# Patient Record
Sex: FEMALE | Race: WHITE | NOT HISPANIC OR LATINO | Employment: OTHER | URBAN - METROPOLITAN AREA
[De-identification: names, ages, dates, MRNs, and addresses within clinical notes are randomized per-mention and may not be internally consistent; named-entity substitution may affect disease eponyms.]

---

## 2023-03-20 ENCOUNTER — TELEPHONE (OUTPATIENT)
Dept: PRIMARY CARE | Facility: CLINIC | Age: 68
End: 2023-03-20
Payer: MEDICARE

## 2023-03-27 ENCOUNTER — CLINICAL SUPPORT (OUTPATIENT)
Dept: PRIMARY CARE | Facility: CLINIC | Age: 68
End: 2023-03-27
Payer: MEDICARE

## 2023-03-27 DIAGNOSIS — J30.2 SEASONAL ALLERGIC RHINITIS, UNSPECIFIED TRIGGER: ICD-10-CM

## 2023-03-27 PROCEDURE — 96372 THER/PROPH/DIAG INJ SC/IM: CPT | Performed by: FAMILY MEDICINE

## 2023-03-27 RX ORDER — TRIAMCINOLONE ACETONIDE 40 MG/ML
80 INJECTION, SUSPENSION INTRA-ARTICULAR; INTRAMUSCULAR ONCE
Status: COMPLETED | OUTPATIENT
Start: 2023-03-27 | End: 2023-03-27

## 2023-03-27 RX ADMIN — TRIAMCINOLONE ACETONIDE 80 MG: 40 INJECTION, SUSPENSION INTRA-ARTICULAR; INTRAMUSCULAR at 18:03

## 2023-03-27 NOTE — PROGRESS NOTES
Pt presents for Kenalog injection per Dr Morales. 2 mL given IM in R gluteal region; no issues w/ injection. Pt tolerated well.

## 2023-04-12 ENCOUNTER — TELEMEDICINE (OUTPATIENT)
Dept: PRIMARY CARE | Facility: CLINIC | Age: 68
End: 2023-04-12
Payer: MEDICARE

## 2023-04-12 DIAGNOSIS — U07.1 COVID-19: Primary | ICD-10-CM

## 2023-04-12 PROBLEM — L65.8 ALOPECIA DUE TO CYTOTOXIC DRUG: Status: ACTIVE | Noted: 2017-08-02

## 2023-04-12 PROBLEM — T45.1X5A CHEMOTHERAPY-INDUCED PERIPHERAL NEUROPATHY (MULTI): Status: ACTIVE | Noted: 2017-08-02

## 2023-04-12 PROBLEM — E55.9 VITAMIN D DEFICIENCY: Status: ACTIVE | Noted: 2017-08-02

## 2023-04-12 PROBLEM — M26.629 ARTHRALGIA OF TEMPOROMANDIBULAR JOINT, UNSPECIFIED SIDE: Status: ACTIVE | Noted: 2023-04-12

## 2023-04-12 PROBLEM — R09.89: Status: ACTIVE | Noted: 2023-04-12

## 2023-04-12 PROBLEM — M89.9 DISORDER OF BONE, UNSPECIFIED: Status: ACTIVE | Noted: 2023-04-12

## 2023-04-12 PROBLEM — J30.9 ALLERGIC RHINITIS: Status: ACTIVE | Noted: 2023-04-12

## 2023-04-12 PROBLEM — E78.5 HYPERLIPIDEMIA: Status: ACTIVE | Noted: 2023-04-12

## 2023-04-12 PROBLEM — H26.33 CATARACT OF BOTH EYES DUE TO DRUG: Status: ACTIVE | Noted: 2017-08-02

## 2023-04-12 PROBLEM — E03.9 ACQUIRED HYPOTHYROIDISM: Status: ACTIVE | Noted: 2017-08-02

## 2023-04-12 PROBLEM — R91.8 MULTIPLE LUNG NODULES ON CT: Status: ACTIVE | Noted: 2017-08-02

## 2023-04-12 PROBLEM — T45.1X5A ALOPECIA DUE TO CYTOTOXIC DRUG: Status: ACTIVE | Noted: 2017-08-02

## 2023-04-12 PROBLEM — G62.0 CHEMOTHERAPY-INDUCED PERIPHERAL NEUROPATHY (MULTI): Status: ACTIVE | Noted: 2017-08-02

## 2023-04-12 PROBLEM — M85.80 OSTEOPENIA: Status: ACTIVE | Noted: 2023-04-12

## 2023-04-12 PROBLEM — C50.919: Status: ACTIVE | Noted: 2023-04-12

## 2023-04-12 PROCEDURE — 99212 OFFICE O/P EST SF 10 MIN: CPT | Performed by: NURSE PRACTITIONER

## 2023-04-12 RX ORDER — ALBUTEROL SULFATE 90 UG/1
AEROSOL, METERED RESPIRATORY (INHALATION)
COMMUNITY
Start: 2021-11-21

## 2023-04-12 RX ORDER — NIRMATRELVIR AND RITONAVIR 300-100 MG
3 KIT ORAL 2 TIMES DAILY
Qty: 30 TABLET | Refills: 0 | Status: SHIPPED | OUTPATIENT
Start: 2023-04-12 | End: 2023-04-17

## 2023-04-12 RX ORDER — LEVOTHYROXINE SODIUM 50 UG/1
TABLET ORAL
COMMUNITY
End: 2023-09-07 | Stop reason: SDUPTHER

## 2023-04-12 RX ORDER — VIT C/E/ZN/COPPR/LUTEIN/ZEAXAN 250MG-90MG
25 CAPSULE ORAL 2 TIMES DAILY
COMMUNITY
Start: 2020-04-10

## 2023-04-12 RX ORDER — TOBRAMYCIN 3 MG/ML
SOLUTION/ DROPS OPHTHALMIC
COMMUNITY
Start: 2020-01-02 | End: 2024-05-21 | Stop reason: SDUPTHER

## 2023-04-12 RX ORDER — PRAVASTATIN SODIUM 20 MG/1
20 TABLET ORAL DAILY
COMMUNITY
End: 2023-09-29 | Stop reason: SDUPTHER

## 2023-04-12 RX ORDER — IBANDRONATE SODIUM 150 MG/1
150 TABLET, FILM COATED ORAL
COMMUNITY

## 2023-04-12 ASSESSMENT — ENCOUNTER SYMPTOMS
SINUS PAIN: 0
CHILLS: 1
COUGH: 1
SORE THROAT: 1
SHORTNESS OF BREATH: 0
WHEEZING: 0
FATIGUE: 1
DIARRHEA: 0
NAUSEA: 0
FEVER: 0
ABDOMINAL PAIN: 0
SINUS PRESSURE: 0
VOMITING: 0

## 2023-04-12 NOTE — PROGRESS NOTES
Subjective   Chief Complaint: Covid-19 Home Monitoring Video Visit (Positive 4/12. Symptoms began 4/11).    HPI   Tatum Molina is a 67 y.o. female who presents for Covid-19 Home Monitoring Video Visit (Positive 4/12. Symptoms began 4/11).    Symptom yesterday -- runny nose, post nasal drip, cough.   Patient denies fever, chills, nausea, vomiting, diarrhea, chest pain, heart palpations, or shortness of breath.   Has not been using anything OTC.     Review of Systems   Constitutional:  Positive for chills and fatigue. Negative for fever.   HENT:  Positive for postnasal drip and sore throat. Negative for dental problem, ear discharge, ear pain, sinus pressure and sinus pain.    Respiratory:  Positive for cough. Negative for shortness of breath and wheezing.    Cardiovascular:  Negative for chest pain.   Gastrointestinal:  Negative for abdominal pain, diarrhea, nausea and vomiting.       Objective   There were no vitals taken for this visit.  BSA There is no height or weight on file to calculate BSA.      Physical Exam  Legacy Encounter on 01/09/2023   Component Date Value Ref Range Status    TSH 01/09/2023 3.31  0.44 - 3.98 mIU/L Final    Comment:  TSH testing is performed using different testing    methodology at [facility] than at other    [facility]. Direct result comparisons should    only be made within the same method.     Legacy Encounter on 12/01/2022   Component Date Value Ref Range Status    Glucose 12/01/2022 92  74 - 99 mg/dL Final    Sodium 12/01/2022 141  136 - 145 mmol/L Final    Potassium 12/01/2022 4.0  3.5 - 5.3 mmol/L Final    Chloride 12/01/2022 107  98 - 107 mmol/L Final    Bicarbonate 12/01/2022 28  21 - 32 mmol/L Final    Anion Gap 12/01/2022 10  10 - 20 mmol/L Final    Urea Nitrogen 12/01/2022 12  6 - 23 mg/dL Final    Creatinine 12/01/2022 0.78  0.50 - 1.05 mg/dL Final    GFR Female 12/01/2022 82  >90 mL/min/1.73m2 Final    Comment:  CALCULATIONS OF ESTIMATED GFR ARE  PERFORMED   USING THE 2021 CKD-EPI STUDY REFIT EQUATION   WITHOUT THE RACE VARIABLE FOR THE IDMS-TRACEABLE   CREATININE METHODS.    https://jasn.asnjournals.org/content/early/2021/09/22/ASN.3068586219      Calcium 12/01/2022 9.2  8.6 - 10.6 mg/dL Final    Albumin 12/01/2022 3.9  3.4 - 5.0 g/dL Final    Alkaline Phosphatase 12/01/2022 28 (L)  33 - 136 U/L Final    Total Protein 12/01/2022 6.2 (L)  6.4 - 8.2 g/dL Final    AST 12/01/2022 27  9 - 39 U/L Final    Total Bilirubin 12/01/2022 0.6  0.0 - 1.2 mg/dL Final    ALT (SGPT) 12/01/2022 24  7 - 45 U/L Final    Comment:  Patients treated with Sulfasalazine may generate    falsely decreased results for ALT.      Cholesterol 12/01/2022 172  0 - 199 mg/dL Final    Comment: .      AGE      DESIRABLE   BORDERLINE HIGH   HIGH     0-19 Y     0 - 169       170 - 199     >/= 200    20-24 Y     0 - 189       190 - 224     >/= 225         >24 Y     0 - 199       200 - 239     >/= 240   **All ranges are based on fasting samples. Specific   therapeutic targets will vary based on patient-specific   cardiac risk.  .   Pediatric guidelines reference:Pediatrics 2011, 128(S5).   Adult guidelines reference: NCEP ATPIII Guidelines,     GAYE 2001, 258:2486-97  .   Venipuncture immediately after or during the    administration of Metamizole may lead to falsely   low results. Testing should be performed immediately   prior to Metamizole dosing.      HDL 12/01/2022 63.9  mg/dL Final    Comment: .      AGE      VERY LOW   LOW     NORMAL    HIGH       0-19 Y       < 35   < 40     40-45     ----    20-24 Y       ----   < 40       >45     ----      >24 Y       ----   < 40     40-60      >60  .      Cholesterol/HDL Ratio 12/01/2022 2.7   Final    Comment: REF VALUES  DESIRABLE  < 3.4  HIGH RISK  > 5.0      LDL 12/01/2022 88  0 - 99 mg/dL Final    Comment: .                           NEAR      BORD      AGE      DESIRABLE  OPTIMAL    HIGH     HIGH     VERY HIGH     0-19 Y     0 - 109     ---     110-129   >/= 130     ----    20-24 Y     0 - 119     ---    120-159   >/= 160     ----      >24 Y     0 -  99   100-129  130-159   160-189     >/=190  .      VLDL 12/01/2022 20  0 - 40 mg/dL Final    Triglycerides 12/01/2022 100  0 - 149 mg/dL Final    Comment: .      AGE      DESIRABLE   BORDERLINE HIGH   HIGH     VERY HIGH   0 D-90 D    19 - 174         ----         ----        ----  91 D- 9 Y     0 -  74        75 -  99     >/= 100      ----    10-19 Y     0 -  89        90 - 129     >/= 130      ----    20-24 Y     0 - 114       115 - 149     >/= 150      ----         >24 Y     0 - 149       150 - 199    200- 499    >/= 500  .   Venipuncture immediately after or during the    administration of Metamizole may lead to falsely   low results. Testing should be performed immediately   prior to Metamizole dosing.      TSH 12/01/2022 4.12 (H)  0.44 - 3.98 mIU/L Final    Comment:  TSH testing is performed using different testing    methodology at [facility] than at other    [facility]. Direct result comparisons should    only be made within the same method.      WBC 12/01/2022 7.3  4.4 - 11.3 x10E9/L Final    nRBC 12/01/2022 0.0  0.0 - 0.0 /100 WBC Final    RBC 12/01/2022 3.40 (L)  4.00 - 5.20 x10E12/L Final    Hemoglobin 12/01/2022 13.1  12.0 - 16.0 g/dL Final    Hematocrit 12/01/2022 38.2  36.0 - 46.0 % Final    MCV 12/01/2022 112 (H)  80 - 100 fL Final    MCHC 12/01/2022 34.3  32.0 - 36.0 g/dL Final    Platelets 12/01/2022 131 (L)  150 - 450 x10E9/L Final    RDW 12/01/2022 14.1  11.5 - 14.5 % Final    Neutrophils % 12/01/2022 20.8  40.0 - 80.0 % Final    Immature Granulocytes %, Automated 12/01/2022 0.1  0.0 - 0.9 % Final    Comment:  Immature Granulocyte Count (IG) includes promyelocytes,    myelocytes and metamyelocytes but does not include bands.   Percent differential counts (%) should be interpreted in the   context of the absolute cell counts (cells/L).      Lymphocytes % 12/01/2022 71.5   13.0 - 44.0 % Final    Monocytes % 12/01/2022 5.8  2.0 - 10.0 % Final    Eosinophils % 12/01/2022 1.4  0.0 - 6.0 % Final    Basophils % 12/01/2022 0.4  0.0 - 2.0 % Final    Neutrophils Absolute 12/01/2022 1.51  1.20 - 7.70 x10E9/L Final    Lymphocytes Absolute 12/01/2022 5.20 (H)  1.20 - 4.80 x10E9/L Final    Monocytes Absolute 12/01/2022 0.42  0.10 - 1.00 x10E9/L Final    Eosinophils Absolute 12/01/2022 0.10  0.00 - 0.70 x10E9/L Final    Basophils Absolute 12/01/2022 0.03  0.00 - 0.10 x10E9/L Final    Vitamin D, 25-Hydroxy 12/01/2022 55  ng/mL Final    Comment: .  DEFICIENCY:         < 20   NG/ML  INSUFFICIENCY:      20-29  NG/ML  SUFFICIENCY:         NG/ML    THIS ASSAY ACCURATELY QUANTIFIES THE SUM OF  VITAMIN D3, 25-HYDROXY AND VIT D2,25-HYDROXY.      Free T4 12/01/2022 1.10  0.78 - 1.48 ng/dL Final    Comment:  Thyroxine Free testing is performed using different testing    methodology at [facility] than at other    [facility]. Direct result comparisons should    only be made within the same method.     Legacy Encounter on 07/14/2022   Component Date Value Ref Range Status    WBC 07/14/2022 5.9  4.4 - 11.3 x10E9/L Final    nRBC 07/14/2022 0.0  0.0 - 0.0 /100 WBC Final    RBC 07/14/2022 3.45 (L)  4.00 - 5.20 x10E12/L Final    Hemoglobin 07/14/2022 13.1  12.0 - 16.0 g/dL Final    Hematocrit 07/14/2022 37.8  36.0 - 46.0 % Final    MCV 07/14/2022 110 (H)  80 - 100 fL Final    MCHC 07/14/2022 34.7  32.0 - 36.0 g/dL Final    Platelets 07/14/2022 131 (L)  150 - 450 x10E9/L Final    RDW 07/14/2022 13.0  11.5 - 14.5 % Final    Neutrophils % 07/14/2022 24.0  40.0 - 80.0 % Final    Immature Granulocytes %, Automated 07/14/2022 0.0  0.0 - 0.9 % Final    Comment:  Immature Granulocyte Count (IG) includes promyelocytes,    myelocytes and metamyelocytes but does not include bands.   Percent differential counts (%) should be interpreted in the   context of the absolute cell counts (cells/L).       Lymphocytes % 07/14/2022 63.7  13.0 - 44.0 % Final    Monocytes % 07/14/2022 8.3  2.0 - 10.0 % Final    Eosinophils % 07/14/2022 3.0  0.0 - 6.0 % Final    Basophils % 07/14/2022 1.0  0.0 - 2.0 % Final    Neutrophils Absolute 07/14/2022 1.42  1.20 - 7.70 x10E9/L Final    Lymphocytes Absolute 07/14/2022 3.78  1.20 - 4.80 x10E9/L Final    Monocytes Absolute 07/14/2022 0.49  0.10 - 1.00 x10E9/L Final    Eosinophils Absolute 07/14/2022 0.18  0.00 - 0.70 x10E9/L Final    Basophils Absolute 07/14/2022 0.06  0.00 - 0.10 x10E9/L Final     Current Outpatient Medications on File Prior to Visit   Medication Sig Dispense Refill    albuterol 90 mcg/actuation inhaler Inhale.      cetirizine (ZYRTEC) 10 mg capsule Take 1 capsule (10 mg) by mouth once daily.      cholecalciferol (Vitamin D-3) 25 MCG (1000 UT) capsule Take 1 capsule (25 mcg) by mouth once daily.      ibandronate (Boniva) 150 mg tablet Take 1 tablet (150 mg) by mouth every 30 (thirty) days.      levothyroxine (Synthroid, Levoxyl) 50 mcg tablet TAKE 1 TABLET BY MOUTH DAILY except TAKE 2 TABLETS BY MOUTH SUNDAY      pravastatin (Pravachol) 20 mg tablet Take 1 tablet (20 mg) by mouth once daily.      tobramycin (Tobrex) 0.3 % ophthalmic solution prn       No current facility-administered medications on file prior to visit.     No images are attached to the encounter.            Assessment/Plan   Problem List Items Addressed This Visit          Infectious/Inflammatory    COVID-19 - Primary     - Advised patient to quarantine for 5 days from symptom onset  - Advised patient to wear a mask for 10 days   - Paxlovid sent to pharmacy  - Patient to report to ED if develops chest pain, SOB, or fevers unrelieved by tyelnol/advil           Relevant Medications    nirmatrelvir-ritonavir (Paxlovid, EUA,) 300 mg (150 mg x 2)-100 mg tablet therapy pack

## 2023-06-29 ENCOUNTER — TELEPHONE (OUTPATIENT)
Dept: PRIMARY CARE | Facility: CLINIC | Age: 68
End: 2023-06-29
Payer: MEDICARE

## 2023-06-29 DIAGNOSIS — E55.9 VITAMIN D DEFICIENCY: ICD-10-CM

## 2023-06-29 DIAGNOSIS — R09.89: ICD-10-CM

## 2023-06-29 DIAGNOSIS — C50.919 ADENOCARCINOMA OF BREAST, UNSPECIFIED LATERALITY (MULTI): ICD-10-CM

## 2023-06-29 DIAGNOSIS — E03.9 ACQUIRED HYPOTHYROIDISM: ICD-10-CM

## 2023-06-29 DIAGNOSIS — E78.2 MIXED HYPERLIPIDEMIA: ICD-10-CM

## 2023-06-29 NOTE — TELEPHONE ENCOUNTER
Please schedule patient for [facility] wellness visit after 12/7/2023.    Thank you-  Edd Driscoll CMA  6/29/2023  Practice Supervisor  [facility]

## 2023-07-08 ENCOUNTER — OFFICE VISIT (OUTPATIENT)
Dept: PRIMARY CARE | Facility: CLINIC | Age: 68
End: 2023-07-08
Payer: MEDICARE

## 2023-07-08 VITALS
HEART RATE: 80 BPM | DIASTOLIC BLOOD PRESSURE: 68 MMHG | BODY MASS INDEX: 28 KG/M2 | SYSTOLIC BLOOD PRESSURE: 140 MMHG | WEIGHT: 147 LBS

## 2023-07-08 DIAGNOSIS — M25.512 ACUTE PAIN OF LEFT SHOULDER: Primary | ICD-10-CM

## 2023-07-08 PROCEDURE — 1036F TOBACCO NON-USER: CPT | Performed by: FAMILY MEDICINE

## 2023-07-08 PROCEDURE — 1159F MED LIST DOCD IN RCRD: CPT | Performed by: FAMILY MEDICINE

## 2023-07-08 PROCEDURE — 99214 OFFICE O/P EST MOD 30 MIN: CPT | Performed by: FAMILY MEDICINE

## 2023-07-08 ASSESSMENT — ENCOUNTER SYMPTOMS
APPETITE CHANGE: 0
PALPITATIONS: 0
ACTIVITY CHANGE: 0
COUGH: 0
DIZZINESS: 0
FATIGUE: 0
COLOR CHANGE: 0
MYALGIAS: 1
HEADACHES: 0
LIGHT-HEADEDNESS: 0
BACK PAIN: 0
CHEST TIGHTNESS: 0
FEVER: 0
ARTHRALGIAS: 0
FACIAL ASYMMETRY: 0
CHOKING: 0

## 2023-07-08 NOTE — PROGRESS NOTES
Subjective   Patient ID: Tatum Molina is a 67 y.o. female who presents for Left shoulder pain X Sunday. .    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 Last Sunday she felt some pain in her neck and shoulder. She thinks this is maybe her rotator cuff. She aggrevated this by carrying her 30+ lb granddaughter. She felt that this made her shoulder worse. She was there carrying her for about 2+ hours. She woke up next am and could feel this getting worse. She did put a Iidocaine patch on this the next day.     Review of Systems   Constitutional:  Negative for activity change, appetite change, fatigue and fever.   HENT:  Negative for congestion.    Respiratory:  Negative for cough, choking and chest tightness.    Cardiovascular:  Negative for chest pain, palpitations and leg swelling.   Musculoskeletal:  Positive for myalgias. Negative for arthralgias, back pain and gait problem.        Left shoulder pain   Skin:  Negative for color change and pallor.   Neurological:  Negative for dizziness, facial asymmetry, light-headedness and headaches.       Objective   /68 (BP Location: Right arm)   Pulse 80   Wt 66.7 kg (147 lb)   BMI 28.00 kg/m²   BSA Body surface area is 1.69 meters squared.      Physical Exam  Constitutional:       General: She is not in acute distress.     Appearance: Normal appearance. She is not toxic-appearing.   HENT:      Head: Normocephalic.   Eyes:      Conjunctiva/sclera: Conjunctivae normal.      Pupils: Pupils are equal, round, and reactive to light.   Cardiovascular:      Rate and Rhythm: Normal rate and regular rhythm.   Musculoskeletal:         General: No swelling.      Right shoulder: Normal.      Left shoulder: Swelling and tenderness present.      Cervical back: No tenderness.      Comments: Pain to palpation over bicipital groove, negative strain to empty can test, cross body motion, negative pain  to 0-90 degrees and  degrees, negative internal rotation   Skin:     Findings: No lesion or rash.   Neurological:      General: No focal deficit present.      Mental Status: She is alert and oriented to person, place, and time. Mental status is at baseline.      Gait: Gait normal.   Psychiatric:         Mood and Affect: Mood normal.         Behavior: Behavior normal.         Thought Content: Thought content normal.         Judgment: Judgment normal.       Legacy Encounter on 01/09/2023   Component Date Value Ref Range Status    TSH 01/09/2023 3.31  0.44 - 3.98 mIU/L Final    Comment:  TSH testing is performed using different testing    methodology at [facility] than at other    [facility]. Direct result comparisons should    only be made within the same method.     Legacy Encounter on 12/01/2022   Component Date Value Ref Range Status    Glucose 12/01/2022 92  74 - 99 mg/dL Final    Sodium 12/01/2022 141  136 - 145 mmol/L Final    Potassium 12/01/2022 4.0  3.5 - 5.3 mmol/L Final    Chloride 12/01/2022 107  98 - 107 mmol/L Final    Bicarbonate 12/01/2022 28  21 - 32 mmol/L Final    Anion Gap 12/01/2022 10  10 - 20 mmol/L Final    Urea Nitrogen 12/01/2022 12  6 - 23 mg/dL Final    Creatinine 12/01/2022 0.78  0.50 - 1.05 mg/dL Final    GFR Female 12/01/2022 82  >90 mL/min/1.73m2 Final    Comment:  CALCULATIONS OF ESTIMATED GFR ARE PERFORMED   USING THE 2021 CKD-EPI STUDY REFIT EQUATION   WITHOUT THE RACE VARIABLE FOR THE IDMS-TRACEABLE   CREATININE METHODS.    https://jasn.asnjournals.org/content/early/2021/09/22/ASN.1745603635      Calcium 12/01/2022 9.2  8.6 - 10.6 mg/dL Final    Albumin 12/01/2022 3.9  3.4 - 5.0 g/dL Final    Alkaline Phosphatase 12/01/2022 28 (L)  33 - 136 U/L Final    Total Protein 12/01/2022 6.2 (L)  6.4 - 8.2 g/dL Final    AST 12/01/2022 27  9 - 39 U/L Final    Total Bilirubin 12/01/2022 0.6  0.0 - 1.2 mg/dL Final    ALT (SGPT) 12/01/2022 24  7 - 45 U/L Final     Comment:  Patients treated with Sulfasalazine may generate    falsely decreased results for ALT.      Cholesterol 12/01/2022 172  0 - 199 mg/dL Final    Comment: .      AGE      DESIRABLE   BORDERLINE HIGH   HIGH     0-19 Y     0 - 169       170 - 199     >/= 200    20-24 Y     0 - 189       190 - 224     >/= 225         >24 Y     0 - 199       200 - 239     >/= 240   **All ranges are based on fasting samples. Specific   therapeutic targets will vary based on patient-specific   cardiac risk.  .   Pediatric guidelines reference:Pediatrics 2011, 128(S5).   Adult guidelines reference: NCEP ATPIII Guidelines,     GAYE 2001, 258:3306-97  .   Venipuncture immediately after or during the    administration of Metamizole may lead to falsely   low results. Testing should be performed immediately   prior to Metamizole dosing.      HDL 12/01/2022 63.9  mg/dL Final    Comment: .      AGE      VERY LOW   LOW     NORMAL    HIGH       0-19 Y       < 35   < 40     40-45     ----    20-24 Y       ----   < 40       >45     ----      >24 Y       ----   < 40     40-60      >60  .      Cholesterol/HDL Ratio 12/01/2022 2.7   Final    Comment: REF VALUES  DESIRABLE  < 3.4  HIGH RISK  > 5.0      LDL 12/01/2022 88  0 - 99 mg/dL Final    Comment: .                           NEAR      BORD      AGE      DESIRABLE  OPTIMAL    HIGH     HIGH     VERY HIGH     0-19 Y     0 - 109     ---    110-129   >/= 130     ----    20-24 Y     0 - 119     ---    120-159   >/= 160     ----      >24 Y     0 -  99   100-129  130-159   160-189     >/=190  .      VLDL 12/01/2022 20  0 - 40 mg/dL Final    Triglycerides 12/01/2022 100  0 - 149 mg/dL Final    Comment: .      AGE      DESIRABLE   BORDERLINE HIGH   HIGH     VERY HIGH   0 D-90 D    19 - 174         ----         ----        ----  91 D- 9 Y     0 -  74        75 -  99     >/= 100      ----    10-19 Y     0 -  89        90 - 129     >/= 130      ----    20-24 Y     0 - 114       115 - 149     >/= 150       ----         >24 Y     0 - 149       150 - 199    200- 499    >/= 500  .   Venipuncture immediately after or during the    administration of Metamizole may lead to falsely   low results. Testing should be performed immediately   prior to Metamizole dosing.      TSH 12/01/2022 4.12 (H)  0.44 - 3.98 mIU/L Final    Comment:  TSH testing is performed using different testing    methodology at [facility] than at other    [facility]. Direct result comparisons should    only be made within the same method.      WBC 12/01/2022 7.3  4.4 - 11.3 x10E9/L Final    nRBC 12/01/2022 0.0  0.0 - 0.0 /100 WBC Final    RBC 12/01/2022 3.40 (L)  4.00 - 5.20 x10E12/L Final    Hemoglobin 12/01/2022 13.1  12.0 - 16.0 g/dL Final    Hematocrit 12/01/2022 38.2  36.0 - 46.0 % Final    MCV 12/01/2022 112 (H)  80 - 100 fL Final    MCHC 12/01/2022 34.3  32.0 - 36.0 g/dL Final    Platelets 12/01/2022 131 (L)  150 - 450 x10E9/L Final    RDW 12/01/2022 14.1  11.5 - 14.5 % Final    Neutrophils % 12/01/2022 20.8  40.0 - 80.0 % Final    Immature Granulocytes %, Automated 12/01/2022 0.1  0.0 - 0.9 % Final    Comment:  Immature Granulocyte Count (IG) includes promyelocytes,    myelocytes and metamyelocytes but does not include bands.   Percent differential counts (%) should be interpreted in the   context of the absolute cell counts (cells/L).      Lymphocytes % 12/01/2022 71.5  13.0 - 44.0 % Final    Monocytes % 12/01/2022 5.8  2.0 - 10.0 % Final    Eosinophils % 12/01/2022 1.4  0.0 - 6.0 % Final    Basophils % 12/01/2022 0.4  0.0 - 2.0 % Final    Neutrophils Absolute 12/01/2022 1.51  1.20 - 7.70 x10E9/L Final    Lymphocytes Absolute 12/01/2022 5.20 (H)  1.20 - 4.80 x10E9/L Final    Monocytes Absolute 12/01/2022 0.42  0.10 - 1.00 x10E9/L Final    Eosinophils Absolute 12/01/2022 0.10  0.00 - 0.70 x10E9/L Final    Basophils Absolute 12/01/2022 0.03  0.00 - 0.10 x10E9/L Final    Vitamin D, 25-Hydroxy 12/01/2022 55  ng/mL Final    Comment:  .  DEFICIENCY:         < 20   NG/ML  INSUFFICIENCY:      20-29  NG/ML  SUFFICIENCY:         NG/ML    THIS ASSAY ACCURATELY QUANTIFIES THE SUM OF  VITAMIN D3, 25-HYDROXY AND VIT D2,25-HYDROXY.      Free T4 12/01/2022 1.10  0.78 - 1.48 ng/dL Final    Comment:  Thyroxine Free testing is performed using different testing    methodology at [facility] than at other    [facility] hospitals. Direct result comparisons should    only be made within the same method.     Legacy Encounter on 07/14/2022   Component Date Value Ref Range Status    WBC 07/14/2022 5.9  4.4 - 11.3 x10E9/L Final    nRBC 07/14/2022 0.0  0.0 - 0.0 /100 WBC Final    RBC 07/14/2022 3.45 (L)  4.00 - 5.20 x10E12/L Final    Hemoglobin 07/14/2022 13.1  12.0 - 16.0 g/dL Final    Hematocrit 07/14/2022 37.8  36.0 - 46.0 % Final    MCV 07/14/2022 110 (H)  80 - 100 fL Final    MCHC 07/14/2022 34.7  32.0 - 36.0 g/dL Final    Platelets 07/14/2022 131 (L)  150 - 450 x10E9/L Final    RDW 07/14/2022 13.0  11.5 - 14.5 % Final    Neutrophils % 07/14/2022 24.0  40.0 - 80.0 % Final    Immature Granulocytes %, Automated 07/14/2022 0.0  0.0 - 0.9 % Final    Comment:  Immature Granulocyte Count (IG) includes promyelocytes,    myelocytes and metamyelocytes but does not include bands.   Percent differential counts (%) should be interpreted in the   context of the absolute cell counts (cells/L).      Lymphocytes % 07/14/2022 63.7  13.0 - 44.0 % Final    Monocytes % 07/14/2022 8.3  2.0 - 10.0 % Final    Eosinophils % 07/14/2022 3.0  0.0 - 6.0 % Final    Basophils % 07/14/2022 1.0  0.0 - 2.0 % Final    Neutrophils Absolute 07/14/2022 1.42  1.20 - 7.70 x10E9/L Final    Lymphocytes Absolute 07/14/2022 3.78  1.20 - 4.80 x10E9/L Final    Monocytes Absolute 07/14/2022 0.49  0.10 - 1.00 x10E9/L Final    Eosinophils Absolute 07/14/2022 0.18  0.00 - 0.70 x10E9/L Final    Basophils Absolute 07/14/2022 0.06  0.00 - 0.10 x10E9/L Final     Current Outpatient Medications on File  Prior to Visit   Medication Sig Dispense Refill    albuterol 90 mcg/actuation inhaler Inhale.      cholecalciferol (Vitamin D-3) 25 MCG (1000 UT) capsule Take 1 capsule (25 mcg) by mouth once daily.      ibandronate (Boniva) 150 mg tablet Take 1 tablet (150 mg) by mouth every 30 (thirty) days.      levothyroxine (Synthroid, Levoxyl) 50 mcg tablet TAKE 1 TABLET BY MOUTH DAILY except TAKE 2 TABLETS BY MOUTH SUNDAY      pravastatin (Pravachol) 20 mg tablet Take 1 tablet (20 mg) by mouth once daily.      tobramycin (Tobrex) 0.3 % ophthalmic solution prn      [DISCONTINUED] cetirizine (ZYRTEC) 10 mg capsule Take 1 capsule (10 mg) by mouth once daily.       No current facility-administered medications on file prior to visit.     No images are attached to the encounter.            Assessment/Plan   Diagnoses and all orders for this visit:  Acute pain of left shoulder  -     XR shoulder left 2+ views; Future

## 2023-09-07 ENCOUNTER — TELEPHONE (OUTPATIENT)
Dept: PRIMARY CARE | Facility: CLINIC | Age: 68
End: 2023-09-07
Payer: MEDICARE

## 2023-09-07 DIAGNOSIS — E03.9 ACQUIRED HYPOTHYROIDISM: ICD-10-CM

## 2023-09-07 NOTE — TELEPHONE ENCOUNTER
Refill request for Levothyroxine 50 mcg, 1 tab daily and 2 tabs on Sunday # 102 x 3     Pended to DD

## 2023-09-08 RX ORDER — LEVOTHYROXINE SODIUM 50 UG/1
TABLET ORAL
Qty: 102 TABLET | Refills: 3 | Status: SHIPPED | OUTPATIENT
Start: 2023-09-08

## 2023-09-28 ENCOUNTER — TELEPHONE (OUTPATIENT)
Dept: PRIMARY CARE | Facility: CLINIC | Age: 68
End: 2023-09-28
Payer: MEDICARE

## 2023-09-28 NOTE — TELEPHONE ENCOUNTER
Pt needs a refill   pravastatin (Pravachol) 20 mg tablet      [facility] [phone]

## 2023-09-28 NOTE — TELEPHONE ENCOUNTER
Pt would also like kenalog shot is it ok to schedule? Pt would like to know if you recommend new Covid booster

## 2023-09-29 DIAGNOSIS — E78.5 HYPERLIPIDEMIA, UNSPECIFIED HYPERLIPIDEMIA TYPE: ICD-10-CM

## 2023-09-29 RX ORDER — PRAVASTATIN SODIUM 20 MG/1
20 TABLET ORAL DAILY
Qty: 90 TABLET | Refills: 0 | Status: SHIPPED | OUTPATIENT
Start: 2023-09-29 | End: 2023-12-06

## 2023-10-20 ENCOUNTER — OFFICE VISIT (OUTPATIENT)
Dept: PRIMARY CARE | Facility: CLINIC | Age: 68
End: 2023-10-20
Payer: MEDICARE

## 2023-10-20 VITALS
WEIGHT: 151.6 LBS | BODY MASS INDEX: 28.88 KG/M2 | HEART RATE: 83 BPM | SYSTOLIC BLOOD PRESSURE: 150 MMHG | OXYGEN SATURATION: 96 % | DIASTOLIC BLOOD PRESSURE: 82 MMHG

## 2023-10-20 DIAGNOSIS — R00.2 PALPITATIONS: Primary | ICD-10-CM

## 2023-10-20 PROCEDURE — 1159F MED LIST DOCD IN RCRD: CPT | Performed by: FAMILY MEDICINE

## 2023-10-20 PROCEDURE — 99214 OFFICE O/P EST MOD 30 MIN: CPT | Performed by: FAMILY MEDICINE

## 2023-10-20 PROCEDURE — 1160F RVW MEDS BY RX/DR IN RCRD: CPT | Performed by: FAMILY MEDICINE

## 2023-10-20 PROCEDURE — 1036F TOBACCO NON-USER: CPT | Performed by: FAMILY MEDICINE

## 2023-10-20 RX ORDER — CALCIUM CARBONATE 600 MG
600 TABLET ORAL
COMMUNITY

## 2023-10-20 ASSESSMENT — ENCOUNTER SYMPTOMS
COLOR CHANGE: 0
CHEST TIGHTNESS: 0
COUGH: 0
BACK PAIN: 0
HEADACHES: 0
FEVER: 0
ARTHRALGIAS: 0
CHOKING: 0
LIGHT-HEADEDNESS: 0
ACTIVITY CHANGE: 0
FATIGUE: 0
FACIAL ASYMMETRY: 0
APPETITE CHANGE: 0
DIZZINESS: 0
PALPITATIONS: 1

## 2023-10-20 NOTE — PROGRESS NOTES
"Subjective   Patient ID: Tatum Molina is a 67 y.o. female who presents for [facility] ER Follow up. .    HPI   Patient explains that she had her shot in her SI joint. Wednesday standing doing dishes she felt heart palpitations. She felt \"irregular\" rhythm.     She sat down thinking this would go away. After 35 seconds she told her  to go to the [facility] center. Upon arrival it was low 100's.     Patient was told upon discharge from emergency room that she would benefit from being on a Holter monitor to see what arrhythmia is occurring.    Review of Systems   Constitutional:  Negative for activity change, appetite change, fatigue and fever.   HENT:  Negative for congestion.    Respiratory:  Negative for cough, choking and chest tightness.    Cardiovascular:  Positive for palpitations. Negative for chest pain and leg swelling.   Musculoskeletal:  Negative for arthralgias, back pain and gait problem.   Skin:  Negative for color change and pallor.   Neurological:  Negative for dizziness, facial asymmetry, light-headedness and headaches.       Objective   /82 (BP Location: Left arm)   Pulse 83   Wt 68.8 kg (151 lb 9.6 oz)   SpO2 96%   BMI 28.88 kg/m²   BSA Body surface area is 1.72 meters squared.      Physical Exam  Constitutional:       General: She is not in acute distress.     Appearance: Normal appearance. She is not toxic-appearing.   HENT:      Head: Normocephalic.      Right Ear: Tympanic membrane, ear canal and external ear normal.      Left Ear: Tympanic membrane, ear canal and external ear normal.   Eyes:      Conjunctiva/sclera: Conjunctivae normal.      Pupils: Pupils are equal, round, and reactive to light.   Cardiovascular:      Rate and Rhythm: Normal rate and regular rhythm.      Pulses: Normal pulses.      Heart sounds: Normal heart sounds.   Pulmonary:      Effort: No respiratory distress.      Breath sounds: No wheezing, rhonchi or rales.   Musculoskeletal:         General: No swelling or " tenderness.      Cervical back: No tenderness.   Skin:     Findings: No lesion or rash.   Neurological:      General: No focal deficit present.      Mental Status: She is alert and oriented to person, place, and time. Mental status is at baseline.      Gait: Gait normal.   Psychiatric:         Mood and Affect: Mood normal.         Behavior: Behavior normal.         Thought Content: Thought content normal.         Judgment: Judgment normal.       Legacy Encounter on 01/09/2023   Component Date Value Ref Range Status    TSH 01/09/2023 3.31  0.44 - 3.98 mIU/L Final    Comment:  TSH testing is performed using different testing    methodology at [facility] than at other    [facility]. Direct result comparisons should    only be made within the same method.     Legacy Encounter on 12/01/2022   Component Date Value Ref Range Status    Glucose 12/01/2022 92  74 - 99 mg/dL Final    Sodium 12/01/2022 141  136 - 145 mmol/L Final    Potassium 12/01/2022 4.0  3.5 - 5.3 mmol/L Final    Chloride 12/01/2022 107  98 - 107 mmol/L Final    Bicarbonate 12/01/2022 28  21 - 32 mmol/L Final    Anion Gap 12/01/2022 10  10 - 20 mmol/L Final    Urea Nitrogen 12/01/2022 12  6 - 23 mg/dL Final    Creatinine 12/01/2022 0.78  0.50 - 1.05 mg/dL Final    GFR Female 12/01/2022 82  >90 mL/min/1.73m2 Final    Comment:  CALCULATIONS OF ESTIMATED GFR ARE PERFORMED   USING THE 2021 CKD-EPI STUDY REFIT EQUATION   WITHOUT THE RACE VARIABLE FOR THE IDMS-TRACEABLE   CREATININE METHODS.    https://jasn.asnjournals.org/content/early/2021/09/22/ASN.3605661623      Calcium 12/01/2022 9.2  8.6 - 10.6 mg/dL Final    Albumin 12/01/2022 3.9  3.4 - 5.0 g/dL Final    Alkaline Phosphatase 12/01/2022 28 (L)  33 - 136 U/L Final    Total Protein 12/01/2022 6.2 (L)  6.4 - 8.2 g/dL Final    AST 12/01/2022 27  9 - 39 U/L Final    Total Bilirubin 12/01/2022 0.6  0.0 - 1.2 mg/dL Final    ALT (SGPT) 12/01/2022 24  7 - 45 U/L Final    Comment:  Patients  treated with Sulfasalazine may generate    falsely decreased results for ALT.      Cholesterol 12/01/2022 172  0 - 199 mg/dL Final    Comment: .      AGE      DESIRABLE   BORDERLINE HIGH   HIGH     0-19 Y     0 - 169       170 - 199     >/= 200    20-24 Y     0 - 189       190 - 224     >/= 225         >24 Y     0 - 199       200 - 239     >/= 240   **All ranges are based on fasting samples. Specific   therapeutic targets will vary based on patient-specific   cardiac risk.  .   Pediatric guidelines reference:Pediatrics 2011, 128(S5).   Adult guidelines reference: NCEP ATPIII Guidelines,     GAYE 2001, 258:2486-97  .   Venipuncture immediately after or during the    administration of Metamizole may lead to falsely   low results. Testing should be performed immediately   prior to Metamizole dosing.      HDL 12/01/2022 63.9  mg/dL Final    Comment: .      AGE      VERY LOW   LOW     NORMAL    HIGH       0-19 Y       < 35   < 40     40-45     ----    20-24 Y       ----   < 40       >45     ----      >24 Y       ----   < 40     40-60      >60  .      Cholesterol/HDL Ratio 12/01/2022 2.7   Final    Comment: REF VALUES  DESIRABLE  < 3.4  HIGH RISK  > 5.0      LDL 12/01/2022 88  0 - 99 mg/dL Final    Comment: .                           NEAR      BORD      AGE      DESIRABLE  OPTIMAL    HIGH     HIGH     VERY HIGH     0-19 Y     0 - 109     ---    110-129   >/= 130     ----    20-24 Y     0 - 119     ---    120-159   >/= 160     ----      >24 Y     0 -  99   100-129  130-159   160-189     >/=190  .      VLDL 12/01/2022 20  0 - 40 mg/dL Final    Triglycerides 12/01/2022 100  0 - 149 mg/dL Final    Comment: .      AGE      DESIRABLE   BORDERLINE HIGH   HIGH     VERY HIGH   0 D-90 D    19 - 174         ----         ----        ----  91 D- 9 Y     0 -  74        75 -  99     >/= 100      ----    10-19 Y     0 -  89        90 - 129     >/= 130      ----    20-24 Y     0 - 114       115 - 149     >/= 150      ----         >24 Y      0 - 149       150 - 199    200- 499    >/= 500  .   Venipuncture immediately after or during the    administration of Metamizole may lead to falsely   low results. Testing should be performed immediately   prior to Metamizole dosing.      TSH 12/01/2022 4.12 (H)  0.44 - 3.98 mIU/L Final    Comment:  TSH testing is performed using different testing    methodology at [facility] than at other    [facility]. Direct result comparisons should    only be made within the same method.      WBC 12/01/2022 7.3  4.4 - 11.3 x10E9/L Final    nRBC 12/01/2022 0.0  0.0 - 0.0 /100 WBC Final    RBC 12/01/2022 3.40 (L)  4.00 - 5.20 x10E12/L Final    Hemoglobin 12/01/2022 13.1  12.0 - 16.0 g/dL Final    Hematocrit 12/01/2022 38.2  36.0 - 46.0 % Final    MCV 12/01/2022 112 (H)  80 - 100 fL Final    MCHC 12/01/2022 34.3  32.0 - 36.0 g/dL Final    Platelets 12/01/2022 131 (L)  150 - 450 x10E9/L Final    RDW 12/01/2022 14.1  11.5 - 14.5 % Final    Neutrophils % 12/01/2022 20.8  40.0 - 80.0 % Final    Immature Granulocytes %, Automated 12/01/2022 0.1  0.0 - 0.9 % Final    Comment:  Immature Granulocyte Count (IG) includes promyelocytes,    myelocytes and metamyelocytes but does not include bands.   Percent differential counts (%) should be interpreted in the   context of the absolute cell counts (cells/L).      Lymphocytes % 12/01/2022 71.5  13.0 - 44.0 % Final    Monocytes % 12/01/2022 5.8  2.0 - 10.0 % Final    Eosinophils % 12/01/2022 1.4  0.0 - 6.0 % Final    Basophils % 12/01/2022 0.4  0.0 - 2.0 % Final    Neutrophils Absolute 12/01/2022 1.51  1.20 - 7.70 x10E9/L Final    Lymphocytes Absolute 12/01/2022 5.20 (H)  1.20 - 4.80 x10E9/L Final    Monocytes Absolute 12/01/2022 0.42  0.10 - 1.00 x10E9/L Final    Eosinophils Absolute 12/01/2022 0.10  0.00 - 0.70 x10E9/L Final    Basophils Absolute 12/01/2022 0.03  0.00 - 0.10 x10E9/L Final    Vitamin D, 25-Hydroxy 12/01/2022 55  ng/mL Final    Comment: .  DEFICIENCY:          < 20   NG/ML  INSUFFICIENCY:      20-29  NG/ML  SUFFICIENCY:         NG/ML    THIS ASSAY ACCURATELY QUANTIFIES THE SUM OF  VITAMIN D3, 25-HYDROXY AND VIT D2,25-HYDROXY.      Free T4 12/01/2022 1.10  0.78 - 1.48 ng/dL Final    Comment:  Thyroxine Free testing is performed using different testing    methodology at [facility] than at other    [facility]. Direct result comparisons should    only be made within the same method.       Current Outpatient Medications on File Prior to Visit   Medication Sig Dispense Refill    calcium carbonate (Calcium 600) 600 mg calcium (1,500 mg) tablet Take 1 tablet (600 mg) by mouth 2 times a day with meals.      cholecalciferol (Vitamin D-3) 25 MCG (1000 UT) capsule Take 1 capsule (25 mcg) by mouth 2 times a day.      ibandronate (Boniva) 150 mg tablet Take 1 tablet (150 mg) by mouth every 30 (thirty) days.      levothyroxine (Synthroid, Levoxyl) 50 mcg tablet TAKE 1 TABLET BY MOUTH DAILY except TAKE 2 TABLETS BY MOUTH SUNDAY 102 tablet 3    pravastatin (Pravachol) 20 mg tablet Take 1 tablet (20 mg) by mouth once daily. 90 tablet 0    albuterol 90 mcg/actuation inhaler Inhale.      tobramycin (Tobrex) 0.3 % ophthalmic solution prn       No current facility-administered medications on file prior to visit.     No images are attached to the encounter.            Assessment/Plan   Diagnoses and all orders for this visit:  Palpitations  -     Holter or Event Cardiac Monitor; Future  -     Transthoracic Echo (TTE) Complete; Future  Patient to have echo and holter monitor  Patient to call if questions or concerns

## 2023-10-23 ENCOUNTER — HOSPITAL ENCOUNTER (OUTPATIENT)
Dept: CARDIOLOGY | Facility: CLINIC | Age: 68
Discharge: HOME | End: 2023-10-23
Payer: MEDICARE

## 2023-10-23 DIAGNOSIS — R00.2 PALPITATIONS: ICD-10-CM

## 2023-10-23 PROCEDURE — 93270 REMOTE 30 DAY ECG REV/REPORT: CPT

## 2023-10-26 ENCOUNTER — TELEPHONE (OUTPATIENT)
Dept: PRIMARY CARE | Facility: CLINIC | Age: 68
End: 2023-10-26
Payer: MEDICARE

## 2023-10-26 NOTE — TELEPHONE ENCOUNTER
Received a call from [employer] regarding a critical EKG on pt's event monitor. They said that pt passed out & her HR was 129 bpm.   I showed Dr Childress since Dr Morales is out of the office. He said to call the pt & see if she really passed out & to see what symptoms she is having. EKG shows sinus tachycardia.    Pt said that she did not pass out & she feels fine. She does get a little winded when she goes up & down the stairs, but she is just taking things more slowly; stating that she knows that she is getting older.  She has her echo scheduled tomorrow. Also, stated that she knows when to go back to the ER. They told her that she has had palpitations & been tachycardic since June when she went to the ER on 10-18-23

## 2023-10-27 ENCOUNTER — HOSPITAL ENCOUNTER (OUTPATIENT)
Dept: CARDIOLOGY | Facility: CLINIC | Age: 68
Discharge: HOME | End: 2023-10-27
Payer: MEDICARE

## 2023-10-27 DIAGNOSIS — R00.2 PALPITATIONS: ICD-10-CM

## 2023-10-27 LAB — EJECTION FRACTION APICAL 4 CHAMBER: 64.4

## 2023-10-27 PROCEDURE — 93306 TTE W/DOPPLER COMPLETE: CPT | Performed by: STUDENT IN AN ORGANIZED HEALTH CARE EDUCATION/TRAINING PROGRAM

## 2023-10-27 PROCEDURE — 93306 TTE W/DOPPLER COMPLETE: CPT

## 2023-11-06 DIAGNOSIS — R93.1 ABNORMAL ECHOCARDIOGRAM: ICD-10-CM

## 2023-11-09 PROCEDURE — 93272 ECG/REVIEW INTERPRET ONLY: CPT | Performed by: INTERNAL MEDICINE

## 2023-11-16 ENCOUNTER — OFFICE VISIT (OUTPATIENT)
Dept: CARDIOLOGY | Facility: CLINIC | Age: 68
End: 2023-11-16
Payer: MEDICARE

## 2023-11-16 VITALS
HEART RATE: 71 BPM | OXYGEN SATURATION: 98 % | BODY MASS INDEX: 28.7 KG/M2 | WEIGHT: 152 LBS | SYSTOLIC BLOOD PRESSURE: 117 MMHG | HEIGHT: 61 IN | DIASTOLIC BLOOD PRESSURE: 63 MMHG

## 2023-11-16 DIAGNOSIS — R00.2 HEART PALPITATIONS: ICD-10-CM

## 2023-11-16 DIAGNOSIS — E78.5 HYPERLIPIDEMIA, UNSPECIFIED HYPERLIPIDEMIA TYPE: Primary | ICD-10-CM

## 2023-11-16 DIAGNOSIS — E03.9 ACQUIRED HYPOTHYROIDISM: ICD-10-CM

## 2023-11-16 DIAGNOSIS — Z85.3 HISTORY OF BREAST CANCER: ICD-10-CM

## 2023-11-16 PROCEDURE — 1160F RVW MEDS BY RX/DR IN RCRD: CPT | Performed by: STUDENT IN AN ORGANIZED HEALTH CARE EDUCATION/TRAINING PROGRAM

## 2023-11-16 PROCEDURE — 1159F MED LIST DOCD IN RCRD: CPT | Performed by: STUDENT IN AN ORGANIZED HEALTH CARE EDUCATION/TRAINING PROGRAM

## 2023-11-16 PROCEDURE — 99204 OFFICE O/P NEW MOD 45 MIN: CPT | Performed by: STUDENT IN AN ORGANIZED HEALTH CARE EDUCATION/TRAINING PROGRAM

## 2023-11-16 PROCEDURE — 99214 OFFICE O/P EST MOD 30 MIN: CPT | Performed by: STUDENT IN AN ORGANIZED HEALTH CARE EDUCATION/TRAINING PROGRAM

## 2023-11-16 PROCEDURE — 1036F TOBACCO NON-USER: CPT | Performed by: STUDENT IN AN ORGANIZED HEALTH CARE EDUCATION/TRAINING PROGRAM

## 2023-11-16 RX ORDER — METOPROLOL SUCCINATE 25 MG/1
25 TABLET, EXTENDED RELEASE ORAL DAILY
Qty: 90 TABLET | Refills: 1 | Status: SHIPPED | OUTPATIENT
Start: 2023-11-16 | End: 2024-03-04

## 2023-11-16 ASSESSMENT — ENCOUNTER SYMPTOMS
ALLERGIC/IMMUNOLOGIC NEGATIVE: 1
CONSTITUTIONAL NEGATIVE: 1
GASTROINTESTINAL NEGATIVE: 1
HEMATOLOGIC/LYMPHATIC NEGATIVE: 1
RESPIRATORY NEGATIVE: 1
PALPITATIONS: 1
NEUROLOGICAL NEGATIVE: 1
EYES NEGATIVE: 1
PSYCHIATRIC NEGATIVE: 1
ENDOCRINE NEGATIVE: 1

## 2023-11-16 NOTE — PATIENT INSTRUCTIONS
For your heart palpitations we are starting a medication called metoprolol to help calm the palpitations down.  If your palpitations are not well controlled we would then repeat a heart monitor study.     For your aortic valve regurgitation, we will followed with heart ultrasound imaging; we will plan to repeat a heart ultrasound in ~ 2-3 years.     We will see you back in clinic in ~ 3 months for a follow-up visit.     Thank you for your visit today. Please contact our office (via [employer] or phone) with any additional questions.     [facility] [place]    Didi, CHING/Clinic Nurse for:    Dr. Lucy Crockett    [address], [address]  [place] [address]    Phone: [phone] Press Option 5 then Option 3 to speak with the Clinic Nurse (Didi)    _____    To Reach:    Billing Questions -    [phone]  Scheduling / Rescheduling -  Option 1  Refills / Medication Requests -  Option 3  General Office /  -  Option 4  Results -     Option 6  Medical Records -    Option 7  Repeat Options -    Option 9

## 2023-11-16 NOTE — PROGRESS NOTES
Cardiology New Patient History and Physical    Reason for referral: palpitations     HPI: Tatum Molina is a 67 y.o.  female who was referred by her PCP for palpitaitons. Past medical history of right breast cancer s/p chemo + radiation (~ 11 years ago), DLD, heart palpitations, and hypothyroidism.      Previously seen in ED for rapid heart rate per her apple watch (showed brief burst of -180s < 1 minute).  Of note patient has history of chronic hip pain for which she gets kenalog injections. Patient followed up with her PCP Dr. Morales who ordered a TTE and holter monitor.     Patient presented to cardiology clinic on 11/16/23. Patient notes intermittent heart palpitations over past couple of months. No chest pain, dyspnea, syncope, pre-syncope, fever / chills, nausea / vomiting, or pedal. TTE showed LVEF %, mild to moderate AI.  Holter monitor showed sinus tachycardia.  Patient continues to have intermittent palpitations, unrelated to exertion.      Past Medical History:   - As above    Surgical History:   She has a past surgical history that includes Gallbladder surgery (09/27/2013); Hysterectomy (09/27/2013); Breast lumpectomy (09/27/2013); Other surgical history (09/27/2013); Other surgical history (12/19/2018); and Eye surgery.    Family History:   Family History   Problem Relation Name Age of Onset    Hypertension Mother      Hyperlipidemia Mother      Hearing loss Father Thang Mink     Hyperlipidemia Father Thang Mink     Hypertension Father Thang Mink     Hypertension Sister      No Known Problems Sister      No Known Problems Brother      No Known Problems Brother       Mother- Aortic aneurysm, TIA, HTN  Father- HTN, DLD, ASHD s/p coronary stenting   Sister- HTN, ASHD s/p coronary stenting; Hx of ablation for arrhythmia     Allergies:  Amoxicillin-pot clavulanate, Animal dander, Atorvastatin calcium, Bee pollen, and Perfume     Social History:   - Former smoker (quit 40 years  "ago); rare alcohol use; no illicit drug use    Prior Cardiovascular Testing (personally reviewed):     TTE (10/27/2023)  1. Left ventricular systolic function is normal with a 55-60% estimated ejection fraction.  2. Spectral Doppler shows an impaired relaxation pattern of left ventricular diastolic filling.  3. Mild to moderate aortic valve regurgitation.    Holter monitor (10/26/2023)- Sinus tachycardia     ECG (10/18/2023)- sinus rhythm    Review of Systems:  Review of Systems   Constitutional: Negative.   HENT: Negative.     Eyes: Negative.    Cardiovascular:  Positive for palpitations.   Respiratory: Negative.     Endocrine: Negative.    Hematologic/Lymphatic: Negative.    Musculoskeletal:  Positive for arthritis.   Gastrointestinal: Negative.    Genitourinary: Negative.    Neurological: Negative.    Psychiatric/Behavioral: Negative.     Allergic/Immunologic: Negative.        Objective     Outpatient Medications:    Current Outpatient Medications:     albuterol 90 mcg/actuation inhaler, Inhale., Disp: , Rfl:     calcium carbonate (Calcium 600) 600 mg calcium (1,500 mg) tablet, Take 1 tablet (600 mg) by mouth 2 times a day with meals., Disp: , Rfl:     cholecalciferol (Vitamin D-3) 25 MCG (1000 UT) capsule, Take 1 capsule (25 mcg) by mouth 2 times a day., Disp: , Rfl:     ibandronate (Boniva) 150 mg tablet, Take 1 tablet (150 mg) by mouth every 30 (thirty) days., Disp: , Rfl:     levothyroxine (Synthroid, Levoxyl) 50 mcg tablet, TAKE 1 TABLET BY MOUTH DAILY except TAKE 2 TABLETS BY MOUTH SUNDAY, Disp: 102 tablet, Rfl: 3    pravastatin (Pravachol) 20 mg tablet, Take 1 tablet (20 mg) by mouth once daily., Disp: 90 tablet, Rfl: 0    tobramycin (Tobrex) 0.3 % ophthalmic solution, prn, Disp: , Rfl:      Last Recorded Vitals  /63 (BP Location: Left arm, Patient Position: Sitting, BP Cuff Size: Adult)   Pulse 71   Ht 1.549 m (5' 1\")   Wt 68.9 kg (152 lb)   SpO2 98%   BMI 28.72 kg/m²     Physical " "Exam:  Physical Exam  Constitutional:       General: She is not in acute distress.  HENT:      Head: Normocephalic.      Mouth/Throat:      Mouth: Mucous membranes are moist.   Eyes:      Extraocular Movements: Extraocular movements intact.      Conjunctiva/sclera: Conjunctivae normal.   Neck:      Vascular: No JVD.   Cardiovascular:      Rate and Rhythm: Normal rate and regular rhythm.      Heart sounds: No murmur heard.  Pulmonary:      Effort: Pulmonary effort is normal. No respiratory distress.      Breath sounds: Normal breath sounds.   Abdominal:      General: Bowel sounds are normal.      Palpations: Abdomen is soft.   Musculoskeletal:         General: No swelling.   Skin:     General: Skin is warm and dry.   Neurological:      General: No focal deficit present.      Mental Status: She is alert.      Cranial Nerves: No cranial nerve deficit.      Motor: No weakness.   Psychiatric:         Mood and Affect: Mood normal.         Behavior: Behavior normal.         Lab Review:    Lab Results   Component Value Date    GLUCOSE 92 12/01/2022    CALCIUM 9.2 12/01/2022     12/01/2022    K 4.0 12/01/2022    CO2 28 12/01/2022     12/01/2022    BUN 12 12/01/2022    CREATININE 0.78 12/01/2022       Lab Results   Component Value Date    WBC 7.3 12/01/2022    HGB 13.1 12/01/2022    HCT 38.2 12/01/2022     (H) 12/01/2022     (L) 12/01/2022       Lab Results   Component Value Date    CHOL 172 12/01/2022    CHOL 166 12/14/2021    CHOL 154 12/09/2020     Lab Results   Component Value Date    HDL 63.9 12/01/2022    HDL 65.8 12/14/2021    HDL 55.9 12/09/2020     No results found for: \"LDLCALC\"  Lab Results   Component Value Date    TRIG 100 12/01/2022    TRIG 91 12/14/2021    TRIG 116 12/09/2020       Lab Results   Component Value Date    TSH 3.31 01/09/2023       Assessment:   67 y.o.  female who was referred by her PCP for palpitaitons. Past medical history of right breast cancer s/p chemo + " radiation (~ 11 years ago), DLD, heart palpitations, and hypothyroidism.      Patient with recent heart palpitations and rapid heart rate detected on Apple Watch.  Holter monitor showed sinus tachycardia.  Suspect patient may have paroxysmal SVT or A. tach given paroxysmal nature of episodes.  We will trial on beta-blockade with metoprolol succinate 25 mg daily.  We will consider repeat Holter monitor (2 weeks) if symptoms persist or suboptimally controlled.    Mild to moderate AI noted on recent transthoracic echocardiogram.  Will monitor clinically and with serial imaging.  No indications for intervention at this time.    Overall Plan:  1.  Heart palpitations-trial of beta-blockade as above; will consider repeat Holter monitor pending clinical course;    2.  Mild to moderate AI-currently asymptomatic; monitor clinically and with serial imaging; repeat transthoracic echocardiogram in 2 to 3 years    3.  Hypothyroidism-continue levothyroxine as per primary care    Disposition: Return to cardiology clinic in 3 months    Melvin Ayala MD

## 2023-12-06 DIAGNOSIS — E78.5 HYPERLIPIDEMIA, UNSPECIFIED HYPERLIPIDEMIA TYPE: ICD-10-CM

## 2023-12-06 RX ORDER — PRAVASTATIN SODIUM 20 MG/1
20 TABLET ORAL DAILY
Qty: 90 TABLET | Refills: 0 | Status: SHIPPED | OUTPATIENT
Start: 2023-12-06 | End: 2024-03-04

## 2023-12-08 ENCOUNTER — TELEPHONE (OUTPATIENT)
Dept: PRIMARY CARE | Facility: CLINIC | Age: 68
End: 2023-12-08
Payer: MEDICARE

## 2023-12-08 NOTE — TELEPHONE ENCOUNTER
Pt has a beta blocker and currently has a head cold and wants to know what she can take otc and if there are any restrictions?

## 2023-12-11 ENCOUNTER — OFFICE VISIT (OUTPATIENT)
Dept: PRIMARY CARE | Facility: CLINIC | Age: 68
End: 2023-12-11
Payer: MEDICARE

## 2023-12-11 VITALS
WEIGHT: 153.6 LBS | SYSTOLIC BLOOD PRESSURE: 132 MMHG | DIASTOLIC BLOOD PRESSURE: 60 MMHG | HEART RATE: 80 BPM | HEIGHT: 61 IN | BODY MASS INDEX: 29 KG/M2

## 2023-12-11 DIAGNOSIS — E55.9 VITAMIN D DEFICIENCY: ICD-10-CM

## 2023-12-11 DIAGNOSIS — Z71.89 ADVANCE DIRECTIVE DISCUSSED WITH PATIENT: ICD-10-CM

## 2023-12-11 DIAGNOSIS — R74.8 ELEVATED LIVER ENZYMES: ICD-10-CM

## 2023-12-11 DIAGNOSIS — Z00.00 HEALTHCARE MAINTENANCE: Primary | ICD-10-CM

## 2023-12-11 DIAGNOSIS — J30.9 ALLERGIC RHINITIS, UNSPECIFIED SEASONALITY, UNSPECIFIED TRIGGER: ICD-10-CM

## 2023-12-11 DIAGNOSIS — G62.0 CHEMOTHERAPY-INDUCED PERIPHERAL NEUROPATHY (MULTI): ICD-10-CM

## 2023-12-11 DIAGNOSIS — I70.0 ATHEROSCLEROSIS OF AORTA (CMS-HCC): ICD-10-CM

## 2023-12-11 DIAGNOSIS — E03.9 ACQUIRED HYPOTHYROIDISM: ICD-10-CM

## 2023-12-11 DIAGNOSIS — J01.10 ACUTE NON-RECURRENT FRONTAL SINUSITIS: ICD-10-CM

## 2023-12-11 DIAGNOSIS — Z00.00 ROUTINE GENERAL MEDICAL EXAMINATION AT HEALTH CARE FACILITY: ICD-10-CM

## 2023-12-11 DIAGNOSIS — Z13.89 ENCOUNTER FOR SCREENING FOR OTHER DISORDER: ICD-10-CM

## 2023-12-11 DIAGNOSIS — Z71.89 CARDIAC RISK COUNSELING: ICD-10-CM

## 2023-12-11 DIAGNOSIS — T45.1X5A CHEMOTHERAPY-INDUCED PERIPHERAL NEUROPATHY (MULTI): ICD-10-CM

## 2023-12-11 PROBLEM — E78.5 HYPERLIPIDEMIA: Status: RESOLVED | Noted: 2023-04-12 | Resolved: 2023-12-11

## 2023-12-11 PROBLEM — U07.1 COVID-19: Status: RESOLVED | Noted: 2023-04-12 | Resolved: 2023-12-11

## 2023-12-11 PROBLEM — R09.89: Status: RESOLVED | Noted: 2023-04-12 | Resolved: 2023-12-11

## 2023-12-11 PROCEDURE — 99214 OFFICE O/P EST MOD 30 MIN: CPT | Performed by: FAMILY MEDICINE

## 2023-12-11 PROCEDURE — 99397 PER PM REEVAL EST PAT 65+ YR: CPT | Performed by: FAMILY MEDICINE

## 2023-12-11 PROCEDURE — 1160F RVW MEDS BY RX/DR IN RCRD: CPT | Performed by: FAMILY MEDICINE

## 2023-12-11 PROCEDURE — 1036F TOBACCO NON-USER: CPT | Performed by: FAMILY MEDICINE

## 2023-12-11 PROCEDURE — G0444 DEPRESSION SCREEN ANNUAL: HCPCS | Performed by: FAMILY MEDICINE

## 2023-12-11 PROCEDURE — 99497 ADVNCD CARE PLAN 30 MIN: CPT | Performed by: FAMILY MEDICINE

## 2023-12-11 PROCEDURE — 1159F MED LIST DOCD IN RCRD: CPT | Performed by: FAMILY MEDICINE

## 2023-12-11 PROCEDURE — G0439 PPPS, SUBSEQ VISIT: HCPCS | Performed by: FAMILY MEDICINE

## 2023-12-11 PROCEDURE — 1158F ADVNC CARE PLAN TLK DOCD: CPT | Performed by: FAMILY MEDICINE

## 2023-12-11 PROCEDURE — 1170F FXNL STATUS ASSESSED: CPT | Performed by: FAMILY MEDICINE

## 2023-12-11 PROCEDURE — G0446 INTENS BEHAVE THER CARDIO DX: HCPCS | Performed by: FAMILY MEDICINE

## 2023-12-11 RX ORDER — LORATADINE 10 MG/1
10 TABLET ORAL DAILY
COMMUNITY
Start: 2023-11-27 | End: 2024-02-06 | Stop reason: WASHOUT

## 2023-12-11 RX ORDER — AZITHROMYCIN 250 MG/1
TABLET, FILM COATED ORAL
Qty: 6 TABLET | Refills: 0 | Status: SHIPPED | OUTPATIENT
Start: 2023-12-11 | End: 2023-12-16

## 2023-12-11 ASSESSMENT — ENCOUNTER SYMPTOMS
COLOR CHANGE: 0
ACTIVITY CHANGE: 0
FEVER: 0
LOSS OF SENSATION IN FEET: 0
LIGHT-HEADEDNESS: 0
OCCASIONAL FEELINGS OF UNSTEADINESS: 0
CHEST TIGHTNESS: 0
COUGH: 0
DEPRESSION: 0
BACK PAIN: 0
FACIAL ASYMMETRY: 0
PALPITATIONS: 0
DIZZINESS: 0
ARTHRALGIAS: 0
CHOKING: 0
HEADACHES: 0
APPETITE CHANGE: 0
FATIGUE: 0

## 2023-12-11 ASSESSMENT — ACTIVITIES OF DAILY LIVING (ADL)
DRESSING: INDEPENDENT
GROCERY_SHOPPING: INDEPENDENT
BATHING: INDEPENDENT
TAKING_MEDICATION: INDEPENDENT
MANAGING_FINANCES: INDEPENDENT
DOING_HOUSEWORK: INDEPENDENT

## 2023-12-11 ASSESSMENT — PATIENT HEALTH QUESTIONNAIRE - PHQ9
1. LITTLE INTEREST OR PLEASURE IN DOING THINGS: NOT AT ALL
SUM OF ALL RESPONSES TO PHQ9 QUESTIONS 1 AND 2: 0
2. FEELING DOWN, DEPRESSED OR HOPELESS: NOT AT ALL

## 2023-12-11 NOTE — ACP (ADVANCE CARE PLANNING)
Confirming Previous Code Status:   Advance Care Planning Note     Discussion Date: 12/11/23   Discussion Participants: patient    The patient wishes to discuss Advance Care Planning today and the following is a brief summary of our discussion.     Patient has capacity to make their own medical decisions: Yes  Health Care Agent/Surrogate Decision Maker documented in chart: Yes    Documents on file and valid:  Advance Directive/Living Will: Yes   Health Care Power of : Yes  Other:     Communication of Medical Status/Prognosis:   stable    Communication of Treatment Goals/Options:   stable    Treatment Decisions  Goals of Care: survival is prioritized, if goals for quality or survival can reasonably be achieved     Follow Up Plan  Stable  Team Members  stable  Time Statement: Total face to face time spent on advance care planning was 5 minutes with 16 minutes spent in counseling, including the explanation.    Carly Morales DO  12/11/2023 9:10 AM

## 2023-12-11 NOTE — PROGRESS NOTES
Subjective   Reason for Visit: Tatum Molina is an 68 y.o. female here for a Medicare Wellness visit.     Past Medical, Surgical, and Family History reviewed and updated in chart.    Reviewed all medications by prescribing practitioner or clinical pharmacist (such as prescriptions, OTCs, herbal therapies and supplements) and documented in the medical record.    HPI  Patient presents for physical exam.      Fam Hx  Mom (65) , hypertension, tobacco, CVA, aortic aneurysm  Dad (62) , hyperlipidemia, tobacco use, massive MI     Exercise walks  ETOH once a month  Caffeine denies  Tobacco quit 30+ years ago, less than a pack a day for 10 years     Oncologist, Dr. Kothari 2012 . Invasive adenocarcinoma right breast chemotherapy right-sided lumpectomy right axillary sentinel node biopsy reexcision for margins in 2012 with radiation     Mammogram Dr. Kothari negative , due ?  DEXA  osteopenia due   Colonoscopy 2019 D. Salvino due      Patient denies other complaints.   Patient Self Assessment of Health Status  Patient Self Assessment: Good    Nutrition and Exercise  Current Diet: Well Balanced Diet  Adequate Fluid Intake: Yes  Caffeine: No  Exercise Frequency: Infrequently    Functional Ability/Level of Safety  Cognitive Impairment Observed: No cognitive impairment observed  Cognitive Impairment Reported: No cognitive impairment reported by patient or family    Home Safety Risk Factors: None    Patient Care Team:  Carly Morales DO as PCP - General  Carly Morales DO as PCP - [employer] Medicare Advantage PCP  Melvin Ayala MD as Consulting Physician (Cardiology)     Review of Systems   Constitutional:  Negative for activity change, appetite change, fatigue and fever.   HENT:  Negative for congestion.    Respiratory:  Negative for cough, choking and chest tightness.    Cardiovascular:  Negative for chest pain, palpitations and leg swelling.   Musculoskeletal:  Negative for  "arthralgias, back pain and gait problem.   Skin:  Negative for color change and pallor.   Neurological:  Negative for dizziness, facial asymmetry, light-headedness and headaches.       Objective   Vitals:  /60 (BP Location: Left arm)   Pulse 80   Ht 1.549 m (5' 1\")   Wt 69.7 kg (153 lb 9.6 oz)   BMI 29.02 kg/m²       Physical Exam  Constitutional:       General: She is not in acute distress.     Appearance: Normal appearance. She is not toxic-appearing.   HENT:      Head: Normocephalic.      Right Ear: Tympanic membrane, ear canal and external ear normal.      Left Ear: Tympanic membrane, ear canal and external ear normal.      Nose: Nose normal.      Mouth/Throat:      Pharynx: Oropharynx is clear.   Eyes:      Conjunctiva/sclera: Conjunctivae normal.      Pupils: Pupils are equal, round, and reactive to light.   Cardiovascular:      Rate and Rhythm: Normal rate and regular rhythm.      Pulses: Normal pulses.      Heart sounds: Normal heart sounds.   Pulmonary:      Effort: No respiratory distress.      Breath sounds: No wheezing, rhonchi or rales.   Abdominal:      General: Bowel sounds are normal. There is no distension.      Palpations: Abdomen is soft.      Tenderness: There is no abdominal tenderness.   Musculoskeletal:         General: No swelling or tenderness.      Cervical back: No tenderness.   Skin:     Findings: No lesion or rash.   Neurological:      General: No focal deficit present.      Mental Status: She is alert and oriented to person, place, and time. Mental status is at baseline.      Gait: Gait normal.   Psychiatric:         Mood and Affect: Mood normal.         Behavior: Behavior normal.         Thought Content: Thought content normal.         Judgment: Judgment normal.       Cardiovascular risk discussed and, if needed, lifestyle modifications recommended, including nutritional choices, exercise, and elimination of habits contributing to risk.  We agreed on a plan to reduce current " cardiovascular risk.  See the ASCVD risk  plus for data discussed regarding risk and risk reduction opportunities.  Aspirin use/disuse was discussed after reviewing updated guidelines.  Assessment/Plan   Problem List Items Addressed This Visit    None  Visit Diagnoses       Healthcare maintenance    -  Primary        1. Patient's blood work discussed at this office visit  2. Patient's LDL goal less than 130, current LDL unavailable  3. Patient's thyroid level is back to normal  4. Patient's vitamin D level is unavailable, continue on vitamin D replacement daily  5. Patient's liver enzymes were elevated, discuss at this office visit  6. Mammogram Dr. Kothari negative 2021, due 2023?  7. DEXA 2021 osteopenia due 2026  8. Colonoscopy 2019 KRYSTAL Leblanc due 2029  9. Patient to call if questions or concerns

## 2024-01-02 ENCOUNTER — LAB (OUTPATIENT)
Dept: LAB | Facility: LAB | Age: 69
End: 2024-01-02
Payer: MEDICARE

## 2024-01-02 DIAGNOSIS — R74.8 ELEVATED LIVER ENZYMES: ICD-10-CM

## 2024-01-02 LAB
ALBUMIN SERPL BCP-MCNC: 4 G/DL (ref 3.4–5)
ALP SERPL-CCNC: 39 U/L (ref 33–136)
ALT SERPL W P-5'-P-CCNC: 28 U/L (ref 7–45)
ANION GAP SERPL CALC-SCNC: 12 MMOL/L (ref 10–20)
AST SERPL W P-5'-P-CCNC: 26 U/L (ref 9–39)
BILIRUB SERPL-MCNC: 0.5 MG/DL (ref 0–1.2)
BUN SERPL-MCNC: 11 MG/DL (ref 6–23)
CALCIUM SERPL-MCNC: 9.6 MG/DL (ref 8.6–10.6)
CHLORIDE SERPL-SCNC: 103 MMOL/L (ref 98–107)
CO2 SERPL-SCNC: 28 MMOL/L (ref 21–32)
CREAT SERPL-MCNC: 0.66 MG/DL (ref 0.5–1.05)
GFR SERPL CREATININE-BSD FRML MDRD: >90 ML/MIN/1.73M*2
GLUCOSE SERPL-MCNC: 88 MG/DL (ref 74–99)
POTASSIUM SERPL-SCNC: 4 MMOL/L (ref 3.5–5.3)
PROT SERPL-MCNC: 6.2 G/DL (ref 6.4–8.2)
SODIUM SERPL-SCNC: 139 MMOL/L (ref 136–145)

## 2024-01-02 PROCEDURE — 80053 COMPREHEN METABOLIC PANEL: CPT

## 2024-01-02 PROCEDURE — 36415 COLL VENOUS BLD VENIPUNCTURE: CPT

## 2024-01-31 PROBLEM — M25.579 ANKLE PAIN: Status: ACTIVE | Noted: 2024-01-31

## 2024-01-31 PROBLEM — J20.9 ACUTE BRONCHITIS: Status: ACTIVE | Noted: 2024-01-31

## 2024-01-31 PROBLEM — N39.0 ACUTE URINARY TRACT INFECTION: Status: ACTIVE | Noted: 2024-01-31

## 2024-01-31 PROBLEM — M54.50 ACUTE LOW BACK PAIN: Status: ACTIVE | Noted: 2024-01-31

## 2024-01-31 PROBLEM — Z85.3 HISTORY OF MALIGNANT NEOPLASM OF BREAST: Status: ACTIVE | Noted: 2023-11-16

## 2024-01-31 PROBLEM — R53.83 FATIGUE: Status: ACTIVE | Noted: 2024-01-31

## 2024-01-31 PROBLEM — R74.8 ELEVATED LIVER ENZYMES: Status: ACTIVE | Noted: 2024-01-31

## 2024-01-31 PROBLEM — B37.31 CANDIDIASIS OF VAGINA: Status: ACTIVE | Noted: 2024-01-31

## 2024-01-31 PROBLEM — R52 GENERALIZED PAIN: Status: ACTIVE | Noted: 2024-01-31

## 2024-01-31 PROBLEM — M79.673 PAIN OF FOOT: Status: ACTIVE | Noted: 2024-01-31

## 2024-01-31 PROBLEM — R05.9 COUGH: Status: ACTIVE | Noted: 2024-01-31

## 2024-01-31 PROBLEM — R00.2 PALPITATIONS: Status: ACTIVE | Noted: 2024-01-31

## 2024-01-31 PROBLEM — R79.89 ABNORMAL COMPLETE BLOOD COUNT: Status: ACTIVE | Noted: 2024-01-31

## 2024-02-06 ENCOUNTER — OFFICE VISIT (OUTPATIENT)
Dept: CARDIOLOGY | Facility: CLINIC | Age: 69
End: 2024-02-06
Payer: MEDICARE

## 2024-02-06 VITALS
SYSTOLIC BLOOD PRESSURE: 114 MMHG | WEIGHT: 152 LBS | DIASTOLIC BLOOD PRESSURE: 69 MMHG | HEIGHT: 61 IN | BODY MASS INDEX: 28.7 KG/M2 | OXYGEN SATURATION: 97 % | HEART RATE: 78 BPM

## 2024-02-06 DIAGNOSIS — Z85.3 HISTORY OF BREAST CANCER: ICD-10-CM

## 2024-02-06 DIAGNOSIS — E78.5 HYPERLIPIDEMIA, UNSPECIFIED HYPERLIPIDEMIA TYPE: ICD-10-CM

## 2024-02-06 DIAGNOSIS — R00.2 HEART PALPITATIONS: ICD-10-CM

## 2024-02-06 DIAGNOSIS — E03.9 ACQUIRED HYPOTHYROIDISM: ICD-10-CM

## 2024-02-06 PROBLEM — C50.919: Status: RESOLVED | Noted: 2023-04-12 | Resolved: 2024-02-06

## 2024-02-06 PROCEDURE — 1036F TOBACCO NON-USER: CPT | Performed by: STUDENT IN AN ORGANIZED HEALTH CARE EDUCATION/TRAINING PROGRAM

## 2024-02-06 PROCEDURE — 99213 OFFICE O/P EST LOW 20 MIN: CPT | Performed by: STUDENT IN AN ORGANIZED HEALTH CARE EDUCATION/TRAINING PROGRAM

## 2024-02-06 PROCEDURE — 1159F MED LIST DOCD IN RCRD: CPT | Performed by: STUDENT IN AN ORGANIZED HEALTH CARE EDUCATION/TRAINING PROGRAM

## 2024-02-06 ASSESSMENT — ENCOUNTER SYMPTOMS
NEUROLOGICAL NEGATIVE: 1
RESPIRATORY NEGATIVE: 1
CONSTITUTIONAL NEGATIVE: 1
HEMATOLOGIC/LYMPHATIC NEGATIVE: 1
ENDOCRINE NEGATIVE: 1
EYES NEGATIVE: 1
GASTROINTESTINAL NEGATIVE: 1
ALLERGIC/IMMUNOLOGIC NEGATIVE: 1
PSYCHIATRIC NEGATIVE: 1
PALPITATIONS: 1

## 2024-02-06 NOTE — PROGRESS NOTES
Cardiology Outpatient Follow-up Visit    Reason for visit: palpitations     HPI: Tatum Molina is a 68 y.o.  female who presents for a follow-up visit. Past medical history of right breast cancer s/p chemo + radiation (~ 11 years ago), DLD, heart palpitations, and hypothyroidism.      Previously seen in ED for rapid heart rate per her apple watch (showed brief burst of -180s < 1 minute).  Of note patient has history of chronic hip pain for which she gets kenalog injections. Patient followed up with her PCP Dr. Morales who ordered a TTE and holter monitor.     Patient presented to cardiology clinic on 11/16/23. Patient notes intermittent heart palpitations over past couple of months. No chest pain, dyspnea, syncope, pre-syncope, fever / chills, nausea / vomiting, or pedal. TTE showed LVEF 55-60%, mild to moderate AI.  Holter monitor showed sinus tachycardia.  Patient continues to have intermittent palpitations, unrelated to exertion.      Tatum presented to cardiology clinic on 2/6/2024.  Heart palpitations are currently well-controlled on metoprolol.  Patient appears euvolemic on exam.  No active cardiovascular complaints at this time.    Past Medical History:   - As above    Surgical History:   She has a past surgical history that includes Gallbladder surgery (09/27/2013); Hysterectomy (09/27/2013); Breast lumpectomy (09/27/2013); Other surgical history (09/27/2013); Other surgical history (12/19/2018); and Eye surgery.    Family History:   Family History   Problem Relation Name Age of Onset    Hypertension Mother      Hyperlipidemia Mother      Hearing loss Father Thang Mink     Hyperlipidemia Father Thang Mink     Hypertension Father Thang Mink     Hypertension Sister      No Known Problems Sister      No Known Problems Brother      No Known Problems Brother       Mother- Aortic aneurysm, TIA, HTN  Father- HTN, DLD, ASHD s/p coronary stenting   Sister- HTN, ASHD s/p coronary stenting;  Hx of ablation for arrhythmia     Allergies:  Amoxicillin-pot clavulanate, Atorvastatin calcium, Animal dander, Bee pollen, and Perfume     Social History:   - Former smoker (quit 40 years ago); rare alcohol use; no illicit drug use    Prior Cardiovascular Testing (personally reviewed):     TTE (10/27/2023)  1. Left ventricular systolic function is normal with a 55-60% estimated ejection fraction.  2. Spectral Doppler shows an impaired relaxation pattern of left ventricular diastolic filling.  3. Mild to moderate aortic valve regurgitation.    Holter monitor (10/26/2023)- Sinus tachycardia     ECG (10/18/2023)- sinus rhythm    Review of Systems:  Review of Systems   Constitutional: Negative.   HENT: Negative.     Eyes: Negative.    Cardiovascular:  Positive for palpitations.   Respiratory: Negative.     Endocrine: Negative.    Hematologic/Lymphatic: Negative.    Musculoskeletal:  Positive for arthritis.   Gastrointestinal: Negative.    Genitourinary: Negative.    Neurological: Negative.    Psychiatric/Behavioral: Negative.     Allergic/Immunologic: Negative.        Objective     Outpatient Medications:    Current Outpatient Medications:     albuterol 90 mcg/actuation inhaler, Inhale., Disp: , Rfl:     calcium carbonate (Calcium 600) 600 mg calcium (1,500 mg) tablet, Take 1 tablet (600 mg) by mouth 2 times a day with meals., Disp: , Rfl:     cholecalciferol (Vitamin D-3) 25 MCG (1000 UT) capsule, Take 1 capsule (25 mcg) by mouth 2 times a day., Disp: , Rfl:     ibandronate (Boniva) 150 mg tablet, Take 1 tablet (150 mg) by mouth every 30 (thirty) days., Disp: , Rfl:     levothyroxine (Synthroid, Levoxyl) 50 mcg tablet, TAKE 1 TABLET BY MOUTH DAILY except TAKE 2 TABLETS BY MOUTH SUNDAY, Disp: 102 tablet, Rfl: 3    metoprolol succinate XL (Toprol-XL) 25 mg 24 hr tablet, Take 1 tablet (25 mg) by mouth once daily. Do not crush or chew., Disp: 90 tablet, Rfl: 1    pravastatin (Pravachol) 20 mg tablet, TAKE 1 TABLET BY  "MOUTH ONCE DAILY, Disp: 90 tablet, Rfl: 0    tobramycin (Tobrex) 0.3 % ophthalmic solution, prn, Disp: , Rfl:      Last Recorded Vitals  /69 (BP Location: Left arm, Patient Position: Sitting, BP Cuff Size: Adult)   Pulse 78   Ht 1.549 m (5' 1\")   Wt 68.9 kg (152 lb)   SpO2 97%   BMI 28.72 kg/m²     Physical Exam:  Physical Exam  Constitutional:       General: She is not in acute distress.  HENT:      Head: Normocephalic.      Mouth/Throat:      Mouth: Mucous membranes are moist.   Eyes:      Extraocular Movements: Extraocular movements intact.      Conjunctiva/sclera: Conjunctivae normal.   Neck:      Vascular: No JVD.   Cardiovascular:      Rate and Rhythm: Normal rate and regular rhythm.      Heart sounds: No murmur heard.  Pulmonary:      Effort: Pulmonary effort is normal. No respiratory distress.      Breath sounds: Normal breath sounds.   Abdominal:      General: There is no distension.   Musculoskeletal:         General: No swelling.   Skin:     General: Skin is warm and dry.   Neurological:      General: No focal deficit present.      Mental Status: She is alert.      Cranial Nerves: No cranial nerve deficit.      Motor: No weakness.   Psychiatric:         Mood and Affect: Mood normal.         Behavior: Behavior normal.         Lab Review:    Lab Results   Component Value Date    GLUCOSE 88 01/02/2024    CALCIUM 9.6 01/02/2024     01/02/2024    K 4.0 01/02/2024    CO2 28 01/02/2024     01/02/2024    BUN 11 01/02/2024    CREATININE 0.66 01/02/2024       Lab Results   Component Value Date    WBC 7.3 12/01/2022    HGB 13.1 12/01/2022    HCT 38.2 12/01/2022     (H) 12/01/2022     (L) 12/01/2022       Lab Results   Component Value Date    CHOL 172 12/01/2022    CHOL 166 12/14/2021    CHOL 154 12/09/2020     Lab Results   Component Value Date    HDL 63.9 12/01/2022    HDL 65.8 12/14/2021    HDL 55.9 12/09/2020     No results found for: \"LDLCALC\"  Lab Results   Component Value " Date    TRIG 100 12/01/2022    TRIG 91 12/14/2021    TRIG 116 12/09/2020       Lab Results   Component Value Date    TSH 3.31 01/09/2023       Assessment:   68 y.o.  female who presents for a follow-up visit. Past medical history of right breast cancer s/p chemo + radiation (~ 11 years ago), DLD, heart palpitations, and hypothyroidism.      Tatum presented to cardiology clinic on 2/6/2024.  Heart palpitations are currently well-controlled on metoprolol.  Patient appears euvolemic on exam.  No active cardiovascular complaints at this time.  Continue cardiac management as below.    Overall Plan:  1.  Heart palpitations-currently well-controlled on beta-blockade; uptitrate beta-blockade as tolerated    2.  Mild to moderate AI-currently asymptomatic; monitor clinically and with serial imaging; repeat transthoracic echocardiogram fall 2025    3.  Hypothyroidism-continue levothyroxine as per primary care    4. DLD- continue pravastatin; dietary and lifestyle modifications     Disposition: Return to cardiology clinic in 12 months or earlier if needed    Melvin Ayala MD

## 2024-02-06 NOTE — PATIENT INSTRUCTIONS
Your heart palpitations are currently well-controlled on metoprolol; we will continue your current medications.     We will see you back in heart clinic in ~ 1 year or earlier if needed.     We will plan to repeat a heart ultrasound in ~ Fall 2025 to re-check your aortic valve given history of mild to moderate aortic regurgitation.     Please call my nurse Didi with any questions; her contact information is below.     Thank you for your visit today. Please contact our office (via [facility] or phone) with any additional questions.     [facility] [place]    Didi, RN/Clinic Nurse for:    Dr. Lucy Crockett    [address]  [place] [address]    Phone: [phone] Press Option 5 then Option 3 to speak with the Clinic Nurse (Didi)    _____    To Reach:    Billing Questions -    [phone]  Scheduling / Rescheduling -  Option 1  Refills / Medication Requests -  Option 3  General Office /  -  Option 4  Results -     Option 6  Medical Records -    Option 7  Repeat Options -    Option 9

## 2024-02-20 ENCOUNTER — APPOINTMENT (OUTPATIENT)
Dept: CARDIOLOGY | Facility: CLINIC | Age: 69
End: 2024-02-20
Payer: MEDICARE

## 2024-03-01 DIAGNOSIS — E78.5 HYPERLIPIDEMIA, UNSPECIFIED HYPERLIPIDEMIA TYPE: ICD-10-CM

## 2024-03-02 DIAGNOSIS — R00.2 HEART PALPITATIONS: ICD-10-CM

## 2024-03-04 ENCOUNTER — TELEPHONE (OUTPATIENT)
Dept: PRIMARY CARE | Facility: CLINIC | Age: 69
End: 2024-03-04
Payer: MEDICARE

## 2024-03-04 RX ORDER — METOPROLOL SUCCINATE 25 MG/1
25 TABLET, EXTENDED RELEASE ORAL DAILY
Qty: 90 TABLET | Refills: 2 | Status: SHIPPED | OUTPATIENT
Start: 2024-05-03 | End: 2024-05-13 | Stop reason: SDUPTHER

## 2024-03-04 RX ORDER — PRAVASTATIN SODIUM 20 MG/1
20 TABLET ORAL DAILY
Qty: 90 TABLET | Refills: 0 | Status: SHIPPED | OUTPATIENT
Start: 2024-03-04

## 2024-03-04 NOTE — TELEPHONE ENCOUNTER
Please schedule patient for [facility] wellness visit in Dec 2024. Schedule with Dr. Morales Please send this encounter to Dr. Morales for lab orders once scheduled.     Thank you-  Edd Driscoll CMA  3/4/2024  Practice Supervisor  [facility]

## 2024-03-11 ENCOUNTER — TELEPHONE (OUTPATIENT)
Dept: CARDIOLOGY | Facility: CLINIC | Age: 69
End: 2024-03-11
Payer: MEDICARE

## 2024-03-11 NOTE — TELEPHONE ENCOUNTER
----- Message from Juhi Velazco sent at 3/11/2024 12:32 PM EDT -----  Regarding: pt needs a call back about medication concern  Good afternoon pt is taking metoprolol succinate XL (Toprol-XL) 25 mg 24 hr tablet and she says it drying out her mouth to the point her tongue feels like it's on fire. She is requesting a callback from someone in Lucy Cao's office about the medication.

## 2024-03-19 ENCOUNTER — OFFICE VISIT (OUTPATIENT)
Dept: PRIMARY CARE | Facility: CLINIC | Age: 69
End: 2024-03-19
Payer: MEDICARE

## 2024-03-19 VITALS
WEIGHT: 147.5 LBS | DIASTOLIC BLOOD PRESSURE: 78 MMHG | HEART RATE: 69 BPM | BODY MASS INDEX: 27.87 KG/M2 | OXYGEN SATURATION: 96 % | SYSTOLIC BLOOD PRESSURE: 124 MMHG | TEMPERATURE: 97.7 F

## 2024-03-19 DIAGNOSIS — R05.1 ACUTE COUGH: ICD-10-CM

## 2024-03-19 DIAGNOSIS — J02.9 PHARYNGITIS, UNSPECIFIED ETIOLOGY: ICD-10-CM

## 2024-03-19 DIAGNOSIS — R50.9 FEVER, UNSPECIFIED FEVER CAUSE: ICD-10-CM

## 2024-03-19 DIAGNOSIS — R09.81 SINUS CONGESTION: Primary | ICD-10-CM

## 2024-03-19 PROCEDURE — 1160F RVW MEDS BY RX/DR IN RCRD: CPT | Performed by: FAMILY MEDICINE

## 2024-03-19 PROCEDURE — 1036F TOBACCO NON-USER: CPT | Performed by: FAMILY MEDICINE

## 2024-03-19 PROCEDURE — 99214 OFFICE O/P EST MOD 30 MIN: CPT | Performed by: FAMILY MEDICINE

## 2024-03-19 PROCEDURE — 87637 SARSCOV2&INF A&B&RSV AMP PRB: CPT

## 2024-03-19 PROCEDURE — 1159F MED LIST DOCD IN RCRD: CPT | Performed by: FAMILY MEDICINE

## 2024-03-19 ASSESSMENT — ENCOUNTER SYMPTOMS
CHILLS: 1
DIAPHORESIS: 0
NECK PAIN: 0
SHORTNESS OF BREATH: 0
HEADACHES: 1
COUGH: 0
SINUS PRESSURE: 1
HOARSE VOICE: 0
SORE THROAT: 0

## 2024-03-19 NOTE — PATIENT INSTRUCTIONS
Start Mucinex FastMax  Call if any worse or if no improvement in 3-5 days  Increase fluids  OTC decongestants as needed  Saline and OTC flonase as needed    Current Outpatient Medications   Medication Sig Dispense Refill    albuterol 90 mcg/actuation inhaler Inhale.      calcium carbonate (Calcium 600) 600 mg calcium (1,500 mg) tablet Take 1 tablet (600 mg) by mouth 2 times a day with meals.      cholecalciferol (Vitamin D-3) 25 MCG (1000 UT) capsule Take 1 capsule (25 mcg) by mouth 2 times a day.      ibandronate (Boniva) 150 mg tablet Take 1 tablet (150 mg) by mouth every 30 (thirty) days.      levothyroxine (Synthroid, Levoxyl) 50 mcg tablet TAKE 1 TABLET BY MOUTH DAILY except TAKE 2 TABLETS BY MOUTH SUNDAY 102 tablet 3    [START ON 5/3/2024] metoprolol succinate XL (Toprol-XL) 25 mg 24 hr tablet Take 1 tablet (25 mg) by mouth once daily. DO NOT CRUSH OR CHEW Do not start before May 3, 2024. 90 tablet 2    pravastatin (Pravachol) 20 mg tablet TAKE 1 TABLET BY MOUTH ONCE DAILY 90 tablet 0    tobramycin (Tobrex) 0.3 % ophthalmic solution prn       No current facility-administered medications for this visit.

## 2024-03-19 NOTE — PROGRESS NOTES
Subjective   Patient ID: Tatum Molina is a 68 y.o. female who presents for Sinusitis (Pain, pressure, fever. Symptoms began 3/18. Negative COVID 3/19).  Today she is accompanied by alone.     Sinusitis  This is a new problem. The current episode started yesterday. The problem has been gradually worsening since onset. The maximum temperature recorded prior to her arrival was 100.4 - 100.9 F. Associated symptoms include chills, congestion, headaches and sinus pressure. Pertinent negatives include no coughing, diaphoresis, ear pain, hoarse voice, neck pain, shortness of breath, sneezing or sore throat. Past treatments include acetaminophen. The treatment provided mild relief.       Review of Systems   Constitutional:  Positive for chills. Negative for diaphoresis.   HENT:  Positive for congestion and sinus pressure. Negative for ear pain, hoarse voice, sneezing and sore throat.    Respiratory:  Negative for cough and shortness of breath.    Musculoskeletal:  Negative for neck pain.   Neurological:  Positive for headaches.       Objective   Blood Pressure 124/78   Pulse 69   Temperature 36.5 °C (97.7 °F) (Oral)   Weight 66.9 kg (147 lb 8 oz)   Oxygen Saturation 96%   Body Mass Index 27.87 kg/m²   BSA: 1.7 meters squared  Growth percentiles: Facility age limit for growth %tawnya is 20 years. Facility age limit for growth %tawnya is 20 years.     Physical Exam  Vitals and nursing note reviewed.   Constitutional:       General: She is not in acute distress.     Appearance: Normal appearance. She is normal weight. She is not ill-appearing.   HENT:      Head: Normocephalic.      Right Ear: Tympanic membrane, ear canal and external ear normal. There is no impacted cerumen.      Left Ear: Ear canal and external ear normal. There is no impacted cerumen.      Nose: Nose normal. No congestion or rhinorrhea.      Mouth/Throat:      Mouth: Mucous membranes are moist.      Pharynx: Oropharynx is clear. No oropharyngeal exudate or  posterior oropharyngeal erythema.   Eyes:      General: No scleral icterus.        Right eye: No discharge.         Left eye: No discharge.      Extraocular Movements: Extraocular movements intact.      Conjunctiva/sclera: Conjunctivae normal.      Pupils: Pupils are equal, round, and reactive to light.   Cardiovascular:      Rate and Rhythm: Normal rate and regular rhythm.      Pulses: Normal pulses.      Heart sounds: Normal heart sounds. No murmur heard.  Pulmonary:      Effort: Pulmonary effort is normal. No respiratory distress.      Breath sounds: Normal breath sounds. No wheezing, rhonchi or rales.   Musculoskeletal:         General: Normal range of motion.      Cervical back: Normal range of motion and neck supple. No rigidity or tenderness.      Right lower leg: No edema.      Left lower leg: No edema.   Lymphadenopathy:      Cervical: No cervical adenopathy.   Skin:     General: Skin is warm and dry.   Neurological:      Mental Status: She is alert and oriented to person, place, and time.   Psychiatric:         Mood and Affect: Mood normal.         Behavior: Behavior normal.         Thought Content: Thought content normal.         Judgment: Judgment normal.         Assessment/Plan   Problem List Items Addressed This Visit             ICD-10-CM    Cough R05.9    Relevant Orders    RSV PCR    Sars-CoV-2 PCR    Influenza A, and B PCR     Other Visit Diagnoses       Diagnosis Codes    Sinus congestion    -  Primary R09.81    Relevant Orders    RSV PCR    Sars-CoV-2 PCR    Influenza A, and B PCR    Pharyngitis, unspecified etiology     J02.9    Relevant Orders    RSV PCR    Fever, unspecified fever cause     R50.9            Start Mucinex FastMax  Call if any worse or if no improvement in 3-5 days  Increase fluids  OTC decongestants as needed  Saline and OTC flonase as needed      Current Outpatient Medications   Medication Sig Dispense Refill    albuterol 90 mcg/actuation inhaler Inhale.      calcium carbonate  (Calcium 600) 600 mg calcium (1,500 mg) tablet Take 1 tablet (600 mg) by mouth 2 times a day with meals.      cholecalciferol (Vitamin D-3) 25 MCG (1000 UT) capsule Take 1 capsule (25 mcg) by mouth 2 times a day.      ibandronate (Boniva) 150 mg tablet Take 1 tablet (150 mg) by mouth every 30 (thirty) days.      levothyroxine (Synthroid, Levoxyl) 50 mcg tablet TAKE 1 TABLET BY MOUTH DAILY except TAKE 2 TABLETS BY MOUTH SUNDAY 102 tablet 3    [START ON 5/3/2024] metoprolol succinate XL (Toprol-XL) 25 mg 24 hr tablet Take 1 tablet (25 mg) by mouth once daily. DO NOT CRUSH OR CHEW Do not start before May 3, 2024. 90 tablet 2    pravastatin (Pravachol) 20 mg tablet TAKE 1 TABLET BY MOUTH ONCE DAILY 90 tablet 0    tobramycin (Tobrex) 0.3 % ophthalmic solution prn       No current facility-administered medications for this visit.

## 2024-03-20 LAB
FLUAV RNA RESP QL NAA+PROBE: NOT DETECTED
FLUBV RNA RESP QL NAA+PROBE: NOT DETECTED
RSV RNA RESP QL NAA+PROBE: NOT DETECTED
SARS-COV-2 RNA RESP QL NAA+PROBE: NOT DETECTED

## 2024-03-20 NOTE — RESULT ENCOUNTER NOTE
Results are normal.  Please see how she is feeling if continues to have problems can call in an antibiotic such as Z-Chidi

## 2024-04-24 ENCOUNTER — APPOINTMENT (OUTPATIENT)
Dept: PRIMARY CARE | Facility: CLINIC | Age: 69
End: 2024-04-24
Payer: MEDICARE

## 2024-05-13 ENCOUNTER — TELEPHONE (OUTPATIENT)
Dept: CARDIOLOGY | Facility: CLINIC | Age: 69
End: 2024-05-13
Payer: MEDICARE

## 2024-05-13 DIAGNOSIS — R00.2 HEART PALPITATIONS: ICD-10-CM

## 2024-05-13 RX ORDER — METOPROLOL SUCCINATE 25 MG/1
25 TABLET, EXTENDED RELEASE ORAL DAILY
Qty: 90 TABLET | Refills: 3 | Status: SHIPPED | OUTPATIENT
Start: 2024-05-13

## 2024-05-13 NOTE — TELEPHONE ENCOUNTER
----- Message from Tatum Molina sent at 5/12/2024 12:11 PM EDT -----  Regarding: Metoprolol  Contact: [phone]  I am out of my  Metoprolol, and the Pharmacy needs a new RX, can you please Renew this for me.      [facility]  [phone]    Thank you!

## 2024-05-21 DIAGNOSIS — J30.2 SEASONAL ALLERGIC RHINITIS, UNSPECIFIED TRIGGER: ICD-10-CM

## 2024-05-21 RX ORDER — TOBRAMYCIN 3 MG/ML
2 SOLUTION/ DROPS OPHTHALMIC 3 TIMES DAILY
Qty: 5 ML | Refills: 0 | Status: SHIPPED | OUTPATIENT
Start: 2024-05-21

## 2024-06-07 ENCOUNTER — TELEPHONE (OUTPATIENT)
Dept: PRIMARY CARE | Facility: CLINIC | Age: 69
End: 2024-06-07
Payer: MEDICARE

## 2024-06-07 NOTE — TELEPHONE ENCOUNTER
Faxed request from [facility]    pravastatin (Pravachol) 20 mg  1 x day qty 90

## 2024-06-11 DIAGNOSIS — E78.5 HYPERLIPIDEMIA, UNSPECIFIED HYPERLIPIDEMIA TYPE: ICD-10-CM

## 2024-06-12 RX ORDER — PRAVASTATIN SODIUM 20 MG/1
20 TABLET ORAL DAILY
Qty: 90 TABLET | Refills: 0 | Status: SHIPPED | OUTPATIENT
Start: 2024-06-12

## 2024-08-09 ENCOUNTER — PATIENT MESSAGE (OUTPATIENT)
Dept: PRIMARY CARE | Facility: CLINIC | Age: 69
End: 2024-08-09
Payer: MEDICARE

## 2024-08-09 DIAGNOSIS — K12.0 CANKER SORE: ICD-10-CM

## 2024-08-14 RX ORDER — VALACYCLOVIR HYDROCHLORIDE 500 MG/1
500 TABLET, FILM COATED ORAL 2 TIMES DAILY
Qty: 6 TABLET | Refills: 0 | Status: SHIPPED | OUTPATIENT
Start: 2024-08-14 | End: 2024-08-17

## 2024-08-23 ENCOUNTER — TELEPHONE (OUTPATIENT)
Dept: PRIMARY CARE | Facility: CLINIC | Age: 69
End: 2024-08-23
Payer: MEDICARE

## 2024-08-23 DIAGNOSIS — E03.9 ACQUIRED HYPOTHYROIDISM: ICD-10-CM

## 2024-08-23 RX ORDER — LEVOTHYROXINE SODIUM 50 UG/1
TABLET ORAL
Qty: 102 TABLET | Refills: 1 | Status: SHIPPED | OUTPATIENT
Start: 2024-08-23

## 2024-08-23 NOTE — TELEPHONE ENCOUNTER
Fax request    levothyroxine (Synthroid, Levoxyl) 50 mcg tablet   TAKE 1 TABLET BY MOUTH DAILY except TAKE 2 TABLETS BY MOUTH SUNDAY    Quantity: 102 tablet     [facility]  [id number] [address]  [address] [place] [address]  Phone: [phone]  Fax: [phone]

## 2024-08-31 DIAGNOSIS — E78.5 HYPERLIPIDEMIA, UNSPECIFIED HYPERLIPIDEMIA TYPE: ICD-10-CM

## 2024-09-03 RX ORDER — PRAVASTATIN SODIUM 20 MG/1
20 TABLET ORAL DAILY
Qty: 90 TABLET | Refills: 0 | Status: SHIPPED | OUTPATIENT
Start: 2024-09-03

## 2024-09-19 ENCOUNTER — OFFICE VISIT (OUTPATIENT)
Dept: PRIMARY CARE | Facility: CLINIC | Age: 69
End: 2024-09-19
Payer: MEDICARE

## 2024-09-19 VITALS
BODY MASS INDEX: 27.02 KG/M2 | SYSTOLIC BLOOD PRESSURE: 125 MMHG | WEIGHT: 143 LBS | TEMPERATURE: 97.4 F | OXYGEN SATURATION: 96 % | HEART RATE: 64 BPM | DIASTOLIC BLOOD PRESSURE: 78 MMHG

## 2024-09-19 DIAGNOSIS — B37.9 YEAST INFECTION: ICD-10-CM

## 2024-09-19 DIAGNOSIS — R30.0 DYSURIA: Primary | ICD-10-CM

## 2024-09-19 DIAGNOSIS — R35.0 URINARY FREQUENCY: ICD-10-CM

## 2024-09-19 LAB
POC APPEARANCE, URINE: CLEAR
POC BILIRUBIN, URINE: NEGATIVE
POC BLOOD, URINE: NEGATIVE
POC COLOR, URINE: YELLOW
POC GLUCOSE, URINE: NEGATIVE MG/DL
POC KETONES, URINE: NEGATIVE MG/DL
POC LEUKOCYTES, URINE: ABNORMAL
POC NITRITE,URINE: NEGATIVE
POC PH, URINE: 7 PH
POC PROTEIN, URINE: NEGATIVE MG/DL
POC SPECIFIC GRAVITY, URINE: 1.02
POC UROBILINOGEN, URINE: 0.2 EU/DL

## 2024-09-19 PROCEDURE — 1159F MED LIST DOCD IN RCRD: CPT | Performed by: STUDENT IN AN ORGANIZED HEALTH CARE EDUCATION/TRAINING PROGRAM

## 2024-09-19 PROCEDURE — 81003 URINALYSIS AUTO W/O SCOPE: CPT | Performed by: STUDENT IN AN ORGANIZED HEALTH CARE EDUCATION/TRAINING PROGRAM

## 2024-09-19 PROCEDURE — 87086 URINE CULTURE/COLONY COUNT: CPT

## 2024-09-19 PROCEDURE — 99213 OFFICE O/P EST LOW 20 MIN: CPT | Performed by: STUDENT IN AN ORGANIZED HEALTH CARE EDUCATION/TRAINING PROGRAM

## 2024-09-19 PROCEDURE — 1160F RVW MEDS BY RX/DR IN RCRD: CPT | Performed by: STUDENT IN AN ORGANIZED HEALTH CARE EDUCATION/TRAINING PROGRAM

## 2024-09-19 RX ORDER — NITROFURANTOIN 25; 75 MG/1; MG/1
100 CAPSULE ORAL 2 TIMES DAILY
Qty: 10 CAPSULE | Refills: 0 | Status: SHIPPED | OUTPATIENT
Start: 2024-09-19 | End: 2024-09-24

## 2024-09-19 RX ORDER — FLUCONAZOLE 150 MG/1
150 TABLET ORAL ONCE
Qty: 2 TABLET | Refills: 0 | Status: SHIPPED | OUTPATIENT
Start: 2024-09-19 | End: 2024-09-19

## 2024-09-19 ASSESSMENT — ENCOUNTER SYMPTOMS
CHILLS: 0
FLANK PAIN: 0
FEVER: 0
FATIGUE: 0
SHORTNESS OF BREATH: 0
DYSURIA: 1
FREQUENCY: 1

## 2024-09-19 NOTE — PROGRESS NOTES
Subjective   Patient ID: Tatum Molina is a 68 y.o. female who presents for UTI.    UTI   Associated symptoms include frequency and urgency. Pertinent negatives include no chills or flank pain.        She has had issues with dry mouth for a while. She is following with ENT. They have her on pilocarpine and famotidine.  Last week She started getting some rash around her labia.  She states she is also had some urinary symptoms including burning with urination in addition to frequency and urgency.  She wanted to make sure that she is not getting a urinary tract infection but was also not sure if it could also be a yeast infection as well.    Review of Systems   Constitutional:  Negative for chills, fatigue and fever.   HENT:  Negative for congestion.    Respiratory:  Negative for shortness of breath.    Cardiovascular:  Negative for chest pain.   Genitourinary:  Positive for dysuria, frequency and urgency. Negative for decreased urine volume and flank pain.   Musculoskeletal:  Negative for arthralgias and myalgias.   Skin:  Positive for rash (Vulvar).       Objective   /78   Pulse 64   Temp 36.3 °C (97.4 °F)   Wt 64.9 kg (143 lb)   SpO2 96%   BMI 27.02 kg/m²     Physical Exam  Vitals and nursing note reviewed. Exam conducted with a chaperone present.   Constitutional:       General: She is not in acute distress.     Appearance: Normal appearance. She is normal weight. She is not ill-appearing or toxic-appearing.   HENT:      Head: Normocephalic and atraumatic.   Cardiovascular:      Rate and Rhythm: Normal rate and regular rhythm.      Heart sounds: Normal heart sounds.   Pulmonary:      Effort: Pulmonary effort is normal.      Breath sounds: Normal breath sounds.   Abdominal:      General: Bowel sounds are normal.      Palpations: Abdomen is soft.   Genitourinary:     Labia:         Right: Rash present. No tenderness, lesion or injury.         Left: Rash present. No tenderness, lesion or injury.     Neurological:      Mental Status: She is alert.         Assessment/Plan   Problem List Items Addressed This Visit    None  Visit Diagnoses         Codes    Dysuria    -  Primary R30.0    Relevant Orders    POCT UA Automated manually resulted (Completed)    Urine Culture (Completed)    Urinary frequency     R35.0    Relevant Orders    POCT UA Automated manually resulted (Completed)    Urine Culture (Completed)    Yeast infection     B37.9          History of physical examination as above.  Based on patient's symptoms and description as well as physical examination, likely yeast infection.  Did send in for Diflucan and the patient will take this as directed.  Did send in for second dose if her symptoms persist.  We will still send for urinary culture just to determine if there was not another bacterial cause.  She will hold off on any antibiotics at least at this time.  She will contact the office if she has any changes in her symptoms or if they do not improve.  We will also call if the urine starts to grow bacteria.  She will call with any other questions or concerns.

## 2024-09-19 NOTE — PATIENT INSTRUCTIONS
I do think that most of the symptoms could be explained likely from the yeast infection.  I did send in for an oral antifungal medication called Diflucan.  You can take 1 tablet 1 time.  If your swelling symptoms 72 hours later, you can take that second dose.  You can hold off on filling the antibiotic at the present.  If you are not getting any relief from the Diflucan or if your urine culture comes back positive, then you can go ahead and start the antibiotics but you can hold off at least for now.    Please call with any other questions or concerns.    Thank you

## 2024-09-21 LAB — BACTERIA UR CULT: NORMAL

## 2024-09-23 ENCOUNTER — PATIENT MESSAGE (OUTPATIENT)
Dept: PRIMARY CARE | Facility: CLINIC | Age: 69
End: 2024-09-23
Payer: MEDICARE

## 2024-09-24 ASSESSMENT — ENCOUNTER SYMPTOMS
DYSURIA: 0
CONSTIPATION: 0
VOMITING: 0
SORE THROAT: 0
BACK PAIN: 0
CHILLS: 0
NAUSEA: 0
DIARRHEA: 0
HEMATURIA: 0
FEVER: 0
FREQUENCY: 1
ABDOMINAL PAIN: 0
FLANK PAIN: 0
HEADACHES: 0
ANOREXIA: 0

## 2024-09-25 ASSESSMENT — ENCOUNTER SYMPTOMS
MYALGIAS: 0
ARTHRALGIAS: 0

## 2024-10-01 ENCOUNTER — APPOINTMENT (OUTPATIENT)
Dept: PRIMARY CARE | Facility: CLINIC | Age: 69
End: 2024-10-01
Payer: MEDICARE

## 2024-10-01 VITALS
DIASTOLIC BLOOD PRESSURE: 70 MMHG | HEART RATE: 67 BPM | TEMPERATURE: 97.5 F | SYSTOLIC BLOOD PRESSURE: 138 MMHG | BODY MASS INDEX: 27.13 KG/M2 | WEIGHT: 143.6 LBS

## 2024-10-01 DIAGNOSIS — L27.0 DRUG ERUPTION: Primary | ICD-10-CM

## 2024-10-01 PROCEDURE — 1159F MED LIST DOCD IN RCRD: CPT | Performed by: FAMILY MEDICINE

## 2024-10-01 PROCEDURE — 99214 OFFICE O/P EST MOD 30 MIN: CPT | Performed by: FAMILY MEDICINE

## 2024-10-01 PROCEDURE — 1036F TOBACCO NON-USER: CPT | Performed by: FAMILY MEDICINE

## 2024-10-01 PROCEDURE — 1160F RVW MEDS BY RX/DR IN RCRD: CPT | Performed by: FAMILY MEDICINE

## 2024-10-01 RX ORDER — DEXAMETHASONE 0.5 MG/5ML
SOLUTION ORAL
COMMUNITY
Start: 2024-09-30

## 2024-10-01 RX ORDER — TRIAMCINOLONE ACETONIDE 1 MG/G
1 CREAM TOPICAL 2 TIMES DAILY
COMMUNITY
Start: 2024-07-19

## 2024-10-01 ASSESSMENT — ENCOUNTER SYMPTOMS
ABDOMINAL PAIN: 0
DIARRHEA: 0
NAUSEA: 0
VOMITING: 0
HEMATURIA: 0
BACK PAIN: 0
FEVER: 0
HEADACHES: 0
FLANK PAIN: 0
DYSURIA: 0
CHILLS: 0
SORE THROAT: 0
FREQUENCY: 1
ANOREXIA: 0
CONSTIPATION: 0

## 2024-10-01 NOTE — PROGRESS NOTES
Subjective   Patient ID: Tatum Molina is a 68 y.o. female who presents for Follow-up (Vaginal rash/Yeast infection. Much better still has slight itching. Swelling has gone down. ).    Female  Problem  The patient's primary symptoms include genital itching and a genital rash. The patient's pertinent negatives include no genital lesions, genital odor, missed menses, pelvic pain, vaginal bleeding or vaginal discharge. This is a new problem. The current episode started 1 to 4 weeks ago. The problem occurs constantly. The problem has been unchanged. The pain is mild. The problem affects both sides. She is not pregnant. Associated symptoms include frequency, rash and urgency. Pertinent negatives include no abdominal pain, anorexia, back pain, chills, constipation, diarrhea, discolored urine, dysuria, fever, flank pain, headaches, hematuria, joint pain, joint swelling, nausea, painful intercourse, sore throat or vomiting. Nothing aggravates the symptoms. She is not sexually active. No, her partner does not have an STD. She uses nothing for contraception. She is postmenopausal.      Patient reports that she is here to follow-up from vaginal rash yeast infection.  She reports that this is much better but still has slight itching.  Reports swelling has gone down.    Patient reports that she actually was started on pilocarpine and famotidine from her ENT and it was thought that potentially this rash was a drug eruption.  Patient explains that she discontinued these 2 medications about 2 weeks ago and that her rash and itching is almost completely gone.    Review of Systems   Constitutional:  Negative for chills and fever.   HENT:  Negative for sore throat.    Gastrointestinal:  Negative for abdominal pain, anorexia, constipation, diarrhea, nausea and vomiting.   Genitourinary:  Positive for frequency and urgency. Negative for dysuria, flank pain, hematuria, missed menses, pelvic pain and vaginal discharge.    Musculoskeletal:  Negative for back pain and joint pain.   Skin:  Positive for rash.   Neurological:  Negative for headaches.       Objective   /70 (BP Location: Left arm, Patient Position: Sitting)   Pulse 67   Temp 36.4 °C (97.5 °F)   Wt 65.1 kg (143 lb 9.6 oz)   BMI 27.13 kg/m²   BSA Body surface area is 1.67 meters squared.      Physical Exam  Constitutional:       General: She is not in acute distress.     Appearance: Normal appearance. She is not toxic-appearing.   HENT:      Head: Normocephalic.      Right Ear: Tympanic membrane, ear canal and external ear normal.      Left Ear: Tympanic membrane, ear canal and external ear normal.   Eyes:      Conjunctiva/sclera: Conjunctivae normal.      Pupils: Pupils are equal, round, and reactive to light.   Cardiovascular:      Rate and Rhythm: Normal rate and regular rhythm.      Pulses: Normal pulses.      Heart sounds: Normal heart sounds.   Pulmonary:      Effort: No respiratory distress.      Breath sounds: No wheezing, rhonchi or rales.   Abdominal:      General: Bowel sounds are normal.   Genitourinary:     Exam position: Knee-chest position.      Pubic Area: No rash.       Labia:         Right: No rash.         Left: No rash.    Musculoskeletal:         General: No swelling or tenderness.      Cervical back: No tenderness.   Skin:     Findings: No lesion or rash.   Neurological:      General: No focal deficit present.      Mental Status: She is alert and oriented to person, place, and time. Mental status is at baseline.      Gait: Gait normal.   Psychiatric:         Mood and Affect: Mood normal.         Behavior: Behavior normal.         Thought Content: Thought content normal.         Judgment: Judgment normal.       Office Visit on 09/19/2024   Component Date Value Ref Range Status    POC Color, Urine 09/19/2024 Yellow  Straw, Yellow, Light-Yellow Final    POC Appearance, Urine 09/19/2024 Clear  Clear Final    POC Glucose, Urine 09/19/2024 NEGATIVE   NEGATIVE mg/dl Final    POC Bilirubin, Urine 09/19/2024 NEGATIVE  NEGATIVE Final    POC Ketones, Urine 09/19/2024 NEGATIVE  NEGATIVE mg/dl Final    POC Specific Gravity, Urine 09/19/2024 1.020  1.005 - 1.035 Final    POC Blood, Urine 09/19/2024 NEGATIVE  NEGATIVE Final    POC PH, Urine 09/19/2024 7.0  No Reference Range Established PH Final    POC Protein, Urine 09/19/2024 NEGATIVE  NEGATIVE, 30 (1+) mg/dl Final    POC Urobilinogen, Urine 09/19/2024 0.2  0.2, 1.0 EU/DL Final    Poc Nitrite, Urine 09/19/2024 NEGATIVE  NEGATIVE Final    POC Leukocytes, Urine 09/19/2024 SMALL (1+) (A)  NEGATIVE Final    Urine Culture 09/19/2024 No significant growth   Final   Office Visit on 03/19/2024   Component Date Value Ref Range Status    RSV PCR 03/19/2024 Not Detected  Not Detected Final    Coronavirus 2019, PCR 03/19/2024 Not Detected  Not Detected Final    Flu A Result 03/19/2024 Not Detected  Not Detected Final    Flu B Result 03/19/2024 Not Detected  Not Detected Final   Lab on 01/02/2024   Component Date Value Ref Range Status    Glucose 01/02/2024 88  74 - 99 mg/dL Final    Sodium 01/02/2024 139  136 - 145 mmol/L Final    Potassium 01/02/2024 4.0  3.5 - 5.3 mmol/L Final    Chloride 01/02/2024 103  98 - 107 mmol/L Final    Bicarbonate 01/02/2024 28  21 - 32 mmol/L Final    Anion Gap 01/02/2024 12  10 - 20 mmol/L Final    Urea Nitrogen 01/02/2024 11  6 - 23 mg/dL Final    Creatinine 01/02/2024 0.66  0.50 - 1.05 mg/dL Final    eGFR 01/02/2024 >90  >60 mL/min/1.73m*2 Final    Calculations of estimated GFR are performed using the 2021 CKD-EPI Study Refit equation without the race variable for the IDMS-Traceable creatinine methods.  https://jasn.asnjournals.org/content/early/2021/09/22/ASN.4419768576    Calcium 01/02/2024 9.6  8.6 - 10.6 mg/dL Final    Albumin 01/02/2024 4.0  3.4 - 5.0 g/dL Final    Alkaline Phosphatase 01/02/2024 39  33 - 136 U/L Final    Total Protein 01/02/2024 6.2 (L)  6.4 - 8.2 g/dL Final    AST  01/02/2024 26  9 - 39 U/L Final    Bilirubin, Total 01/02/2024 0.5  0.0 - 1.2 mg/dL Final    ALT 01/02/2024 28  7 - 45 U/L Final    Patients treated with Sulfasalazine may generate falsely decreased results for ALT.   Hospital Outpatient Visit on 10/27/2023   Component Date Value Ref Range Status    LV A4C EF 10/27/2023 64.4   Final     Current Outpatient Medications on File Prior to Visit   Medication Sig Dispense Refill    albuterol 90 mcg/actuation inhaler Inhale.      calcium carbonate (Calcium 600) 600 mg calcium (1,500 mg) tablet Take 1 tablet (600 mg) by mouth 2 times a day with meals.      cholecalciferol (Vitamin D-3) 25 MCG (1000 UT) capsule Take 1 capsule (25 mcg) by mouth 2 times a day.      dexAMETHasone 0.5 mg/5 mL solution 5 mL swish and spit 3 times a day as needed, swish as long as possible      ibandronate (Boniva) 150 mg tablet Take 1 tablet (150 mg) by mouth every 30 (thirty) days.      levothyroxine (Synthroid, Levoxyl) 50 mcg tablet TAKE 1 TABLET BY MOUTH DAILY except TAKE 2 TABLETS BY MOUTH SUNDAY 102 tablet 1    metoprolol succinate XL (Toprol-XL) 25 mg 24 hr tablet Take 1 tablet (25 mg) by mouth once daily. DO NOT CRUSH OR CHEW 90 tablet 3    pravastatin (Pravachol) 20 mg tablet TAKE 1 TABLET BY MOUTH ONCE DAILY 90 tablet 0    tobramycin (Tobrex) 0.3 % ophthalmic solution Administer 2 drops into the right eye 3 times a day. 5 mL 0    triamcinolone (Kenalog) 0.1 % cream Apply 1 Application topically 2 times a day. apply to affected area      [DISCONTINUED] nitrofurantoin, macrocrystal-monohydrate, (Macrobid) 100 mg capsule Take 1 capsule (100 mg) by mouth 2 times a day for 5 days. 10 capsule 0     No current facility-administered medications on file prior to visit.     No images are attached to the encounter.            Assessment/Plan   Diagnoses and all orders for this visit:  Drug eruption    1.  Patient rash is thought to be from drug option from famotidine potentially continue to use  Benadryl cream  2.  Patient to call if questions or concerns

## 2024-11-27 ENCOUNTER — PATIENT MESSAGE (OUTPATIENT)
Dept: PRIMARY CARE | Facility: CLINIC | Age: 69
End: 2024-11-27
Payer: MEDICARE

## 2024-11-27 DIAGNOSIS — E78.5 HYPERLIPIDEMIA, UNSPECIFIED HYPERLIPIDEMIA TYPE: ICD-10-CM

## 2024-11-27 DIAGNOSIS — R00.2 HEART PALPITATIONS: ICD-10-CM

## 2024-11-27 RX ORDER — PRAVASTATIN SODIUM 20 MG/1
20 TABLET ORAL DAILY
Qty: 90 TABLET | Refills: 0 | Status: SHIPPED | OUTPATIENT
Start: 2024-11-27

## 2024-11-27 RX ORDER — METOPROLOL SUCCINATE 25 MG/1
25 TABLET, EXTENDED RELEASE ORAL DAILY
Qty: 90 TABLET | Refills: 0 | Status: SHIPPED | OUTPATIENT
Start: 2024-11-27

## 2024-12-02 ENCOUNTER — LAB (OUTPATIENT)
Dept: LAB | Facility: LAB | Age: 69
End: 2024-12-02
Payer: MEDICARE

## 2024-12-02 DIAGNOSIS — G62.0 CHEMOTHERAPY-INDUCED PERIPHERAL NEUROPATHY (MULTI): ICD-10-CM

## 2024-12-02 DIAGNOSIS — E78.2 MIXED HYPERLIPIDEMIA: ICD-10-CM

## 2024-12-02 DIAGNOSIS — E55.9 VITAMIN D DEFICIENCY: ICD-10-CM

## 2024-12-02 DIAGNOSIS — T45.1X5A CHEMOTHERAPY-INDUCED PERIPHERAL NEUROPATHY (MULTI): ICD-10-CM

## 2024-12-02 DIAGNOSIS — E03.9 ACQUIRED HYPOTHYROIDISM: ICD-10-CM

## 2024-12-02 LAB
25(OH)D3 SERPL-MCNC: 41 NG/ML (ref 30–100)
ALBUMIN SERPL BCP-MCNC: 4 G/DL (ref 3.4–5)
ALP SERPL-CCNC: 38 U/L (ref 33–136)
ALT SERPL W P-5'-P-CCNC: 22 U/L (ref 7–45)
ANION GAP SERPL CALC-SCNC: 11 MMOL/L (ref 10–20)
AST SERPL W P-5'-P-CCNC: 22 U/L (ref 9–39)
BASOPHILS # BLD MANUAL: 0.2 X10*3/UL (ref 0–0.1)
BASOPHILS NFR BLD MANUAL: 1.7 %
BILIRUB SERPL-MCNC: 0.5 MG/DL (ref 0–1.2)
BUN SERPL-MCNC: 11 MG/DL (ref 6–23)
CALCIUM SERPL-MCNC: 9.2 MG/DL (ref 8.6–10.6)
CHLORIDE SERPL-SCNC: 104 MMOL/L (ref 98–107)
CHOLEST SERPL-MCNC: 170 MG/DL (ref 0–199)
CHOLESTEROL/HDL RATIO: 3.6
CO2 SERPL-SCNC: 28 MMOL/L (ref 21–32)
CREAT SERPL-MCNC: 0.66 MG/DL (ref 0.5–1.05)
EGFRCR SERPLBLD CKD-EPI 2021: >90 ML/MIN/1.73M*2
EOSINOPHIL # BLD MANUAL: 0.2 X10*3/UL (ref 0–0.4)
EOSINOPHIL NFR BLD MANUAL: 1.7 %
ERYTHROCYTE [DISTWIDTH] IN BLOOD BY AUTOMATED COUNT: 12.8 % (ref 11.5–14.5)
GLUCOSE SERPL-MCNC: 97 MG/DL (ref 74–99)
HCT VFR BLD AUTO: 37.9 % (ref 36–46)
HDLC SERPL-MCNC: 47.7 MG/DL
HGB BLD-MCNC: 12.6 G/DL (ref 12–16)
IMM GRANULOCYTES # BLD AUTO: 0.01 X10*3/UL (ref 0–0.5)
IMM GRANULOCYTES NFR BLD AUTO: 0.1 % (ref 0–0.9)
LDLC SERPL CALC-MCNC: 86 MG/DL
LYMPHOCYTES # BLD MANUAL: 6.94 X10*3/UL (ref 0.8–3)
LYMPHOCYTES NFR BLD MANUAL: 58.8 %
MCH RBC QN AUTO: 35.8 PG (ref 26–34)
MCHC RBC AUTO-ENTMCNC: 33.2 G/DL (ref 32–36)
MCV RBC AUTO: 108 FL (ref 80–100)
MONOCYTES # BLD MANUAL: 0.5 X10*3/UL (ref 0.05–0.8)
MONOCYTES NFR BLD MANUAL: 4.2 %
NEUTS SEG # BLD MANUAL: 2.48 X10*3/UL (ref 1.6–5)
NEUTS SEG NFR BLD MANUAL: 21 %
NON HDL CHOLESTEROL: 122 MG/DL (ref 0–149)
NRBC BLD-RTO: 0 /100 WBCS (ref 0–0)
PLATELET # BLD AUTO: 139 X10*3/UL (ref 150–450)
POTASSIUM SERPL-SCNC: 3.9 MMOL/L (ref 3.5–5.3)
PROT SERPL-MCNC: 6.4 G/DL (ref 6.4–8.2)
RBC # BLD AUTO: 3.52 X10*6/UL (ref 4–5.2)
RBC MORPH BLD: ABNORMAL
SODIUM SERPL-SCNC: 139 MMOL/L (ref 136–145)
TOTAL CELLS COUNTED BLD: 119
TRIGL SERPL-MCNC: 181 MG/DL (ref 0–149)
TSH SERPL-ACNC: 2.77 MIU/L (ref 0.44–3.98)
VARIANT LYMPHS # BLD MANUAL: 1.49 X10*3/UL (ref 0–0.3)
VARIANT LYMPHS NFR BLD: 12.6 %
VLDL: 36 MG/DL (ref 0–40)
WBC # BLD AUTO: 11.8 X10*3/UL (ref 4.4–11.3)

## 2024-12-02 PROCEDURE — 36415 COLL VENOUS BLD VENIPUNCTURE: CPT

## 2024-12-02 PROCEDURE — 85027 COMPLETE CBC AUTOMATED: CPT

## 2024-12-02 PROCEDURE — 82306 VITAMIN D 25 HYDROXY: CPT

## 2024-12-02 PROCEDURE — 80061 LIPID PANEL: CPT

## 2024-12-02 PROCEDURE — 80053 COMPREHEN METABOLIC PANEL: CPT

## 2024-12-02 PROCEDURE — 84443 ASSAY THYROID STIM HORMONE: CPT

## 2024-12-02 PROCEDURE — 85007 BL SMEAR W/DIFF WBC COUNT: CPT

## 2024-12-09 ENCOUNTER — APPOINTMENT (OUTPATIENT)
Dept: PRIMARY CARE | Facility: CLINIC | Age: 69
End: 2024-12-09
Payer: MEDICARE

## 2024-12-16 ENCOUNTER — APPOINTMENT (OUTPATIENT)
Dept: PRIMARY CARE | Facility: CLINIC | Age: 69
End: 2024-12-16
Payer: MEDICARE

## 2024-12-16 VITALS
DIASTOLIC BLOOD PRESSURE: 64 MMHG | HEIGHT: 61 IN | HEART RATE: 82 BPM | SYSTOLIC BLOOD PRESSURE: 122 MMHG | WEIGHT: 141.4 LBS | BODY MASS INDEX: 26.7 KG/M2

## 2024-12-16 DIAGNOSIS — E55.9 VITAMIN D DEFICIENCY: ICD-10-CM

## 2024-12-16 DIAGNOSIS — R74.8 ELEVATED LIVER ENZYMES: ICD-10-CM

## 2024-12-16 DIAGNOSIS — E78.5 HYPERLIPIDEMIA, UNSPECIFIED HYPERLIPIDEMIA TYPE: ICD-10-CM

## 2024-12-16 DIAGNOSIS — I70.0 ATHEROSCLEROSIS OF AORTA (CMS-HCC): ICD-10-CM

## 2024-12-16 DIAGNOSIS — T45.1X5A CHEMOTHERAPY-INDUCED PERIPHERAL NEUROPATHY (MULTI): ICD-10-CM

## 2024-12-16 DIAGNOSIS — R09.89 ARTERIAL BRUIT: ICD-10-CM

## 2024-12-16 DIAGNOSIS — M85.80 OSTEOPENIA, UNSPECIFIED LOCATION: ICD-10-CM

## 2024-12-16 DIAGNOSIS — Z00.00 ROUTINE GENERAL MEDICAL EXAMINATION AT HEALTH CARE FACILITY: ICD-10-CM

## 2024-12-16 DIAGNOSIS — Z71.89 CARDIAC RISK COUNSELING: ICD-10-CM

## 2024-12-16 DIAGNOSIS — G62.0 CHEMOTHERAPY-INDUCED PERIPHERAL NEUROPATHY (MULTI): ICD-10-CM

## 2024-12-16 DIAGNOSIS — Z00.00 HEALTHCARE MAINTENANCE: Primary | ICD-10-CM

## 2024-12-16 DIAGNOSIS — R79.89 ABNORMAL COMPLETE BLOOD COUNT: ICD-10-CM

## 2024-12-16 DIAGNOSIS — E03.9 ACQUIRED HYPOTHYROIDISM: ICD-10-CM

## 2024-12-16 DIAGNOSIS — Z71.89 ADVANCE DIRECTIVE DISCUSSED WITH PATIENT: ICD-10-CM

## 2024-12-16 PROBLEM — M79.673 PAIN OF FOOT: Status: RESOLVED | Noted: 2024-01-31 | Resolved: 2024-12-16

## 2024-12-16 PROBLEM — J20.9 ACUTE BRONCHITIS: Status: RESOLVED | Noted: 2024-01-31 | Resolved: 2024-12-16

## 2024-12-16 PROBLEM — M25.579 ANKLE PAIN: Status: RESOLVED | Noted: 2024-01-31 | Resolved: 2024-12-16

## 2024-12-16 PROBLEM — N39.0 ACUTE URINARY TRACT INFECTION: Status: RESOLVED | Noted: 2024-01-31 | Resolved: 2024-12-16

## 2024-12-16 PROBLEM — R52 GENERALIZED PAIN: Status: RESOLVED | Noted: 2024-01-31 | Resolved: 2024-12-16

## 2024-12-16 PROBLEM — R53.83 FATIGUE: Status: RESOLVED | Noted: 2024-01-31 | Resolved: 2024-12-16

## 2024-12-16 PROBLEM — M54.50 ACUTE LOW BACK PAIN: Status: RESOLVED | Noted: 2024-01-31 | Resolved: 2024-12-16

## 2024-12-16 PROBLEM — R05.9 COUGH: Status: RESOLVED | Noted: 2024-01-31 | Resolved: 2024-12-16

## 2024-12-16 PROBLEM — H26.33 CATARACT OF BOTH EYES DUE TO DRUG: Status: RESOLVED | Noted: 2017-08-02 | Resolved: 2024-12-16

## 2024-12-16 PROBLEM — B37.31 CANDIDIASIS OF VAGINA: Status: RESOLVED | Noted: 2024-01-31 | Resolved: 2024-12-16

## 2024-12-16 PROCEDURE — G0446 INTENS BEHAVE THER CARDIO DX: HCPCS | Performed by: FAMILY MEDICINE

## 2024-12-16 PROCEDURE — 1036F TOBACCO NON-USER: CPT | Performed by: FAMILY MEDICINE

## 2024-12-16 PROCEDURE — 1160F RVW MEDS BY RX/DR IN RCRD: CPT | Performed by: FAMILY MEDICINE

## 2024-12-16 PROCEDURE — 99497 ADVNCD CARE PLAN 30 MIN: CPT | Performed by: FAMILY MEDICINE

## 2024-12-16 PROCEDURE — 99397 PER PM REEVAL EST PAT 65+ YR: CPT | Performed by: FAMILY MEDICINE

## 2024-12-16 PROCEDURE — 1159F MED LIST DOCD IN RCRD: CPT | Performed by: FAMILY MEDICINE

## 2024-12-16 PROCEDURE — 99214 OFFICE O/P EST MOD 30 MIN: CPT | Performed by: FAMILY MEDICINE

## 2024-12-16 PROCEDURE — 1170F FXNL STATUS ASSESSED: CPT | Performed by: FAMILY MEDICINE

## 2024-12-16 PROCEDURE — 1158F ADVNC CARE PLAN TLK DOCD: CPT | Performed by: FAMILY MEDICINE

## 2024-12-16 PROCEDURE — 1123F ACP DISCUSS/DSCN MKR DOCD: CPT | Performed by: FAMILY MEDICINE

## 2024-12-16 PROCEDURE — 3008F BODY MASS INDEX DOCD: CPT | Performed by: FAMILY MEDICINE

## 2024-12-16 PROCEDURE — G0439 PPPS, SUBSEQ VISIT: HCPCS | Performed by: FAMILY MEDICINE

## 2024-12-16 RX ORDER — PILOCARPINE HYDROCHLORIDE 5 MG/1
5 TABLET, FILM COATED ORAL 4 TIMES DAILY
COMMUNITY
Start: 2024-12-05

## 2024-12-16 ASSESSMENT — ENCOUNTER SYMPTOMS
BACK PAIN: 0
LOSS OF SENSATION IN FEET: 0
HEADACHES: 0
DIZZINESS: 0
ARTHRALGIAS: 0
DEPRESSION: 0
LIGHT-HEADEDNESS: 0
OCCASIONAL FEELINGS OF UNSTEADINESS: 0
FACIAL ASYMMETRY: 0
FEVER: 0
CHEST TIGHTNESS: 0
COUGH: 0
ACTIVITY CHANGE: 0
CHOKING: 0
COLOR CHANGE: 0
PALPITATIONS: 0
APPETITE CHANGE: 0
FATIGUE: 0

## 2024-12-16 ASSESSMENT — PATIENT HEALTH QUESTIONNAIRE - PHQ9
SUM OF ALL RESPONSES TO PHQ9 QUESTIONS 1 AND 2: 0
10. IF YOU CHECKED OFF ANY PROBLEMS, HOW DIFFICULT HAVE THESE PROBLEMS MADE IT FOR YOU TO DO YOUR WORK, TAKE CARE OF THINGS AT HOME, OR GET ALONG WITH OTHER PEOPLE: NOT DIFFICULT AT ALL
1. LITTLE INTEREST OR PLEASURE IN DOING THINGS: NOT AT ALL
2. FEELING DOWN, DEPRESSED OR HOPELESS: NOT AT ALL

## 2024-12-16 ASSESSMENT — ACTIVITIES OF DAILY LIVING (ADL)
TAKING_MEDICATION: INDEPENDENT
MANAGING_FINANCES: INDEPENDENT
DRESSING: INDEPENDENT
BATHING: INDEPENDENT
DOING_HOUSEWORK: INDEPENDENT
GROCERY_SHOPPING: INDEPENDENT

## 2024-12-16 NOTE — PROGRESS NOTES
Subjective   Reason for Visit: Tatum Molina is an 69 y.o. female here for a Medicare Wellness visit.     Past Medical, Surgical, and Family History reviewed and updated in chart.    Reviewed all medications by prescribing practitioner or clinical pharmacist (such as prescriptions, OTCs, herbal therapies and supplements) and documented in the medical record.    HPI  Patient presents for physical exam.      Fam Hx  Mom (65) , hypertension, tobacco, CVA, aortic aneurysm  Dad (62) , hyperlipidemia, tobacco use, massive MI     Exercise walks  ETOH once a month  Caffeine denies  Tobacco quit 30+ years ago, less than a pack a day for 10 years     Oncologist, Dr. Kothari 2012 . Invasive adenocarcinoma right breast chemotherapy right-sided lumpectomy right axillary sentinel node biopsy reexcision for margins in 2012 with radiation     Mammogram negative 2024  DEXA  osteopenia due   Colonoscopy  D. Salvino due      Patient denies other complaints.   Patient Self Assessment of Health Status  Patient Self Assessment: Good    Nutrition and Exercise  Current Diet: Well Balanced Diet  Adequate Fluid Intake: Yes  Caffeine: No  Exercise Frequency: Regularly    Functional Ability/Level of Safety  Cognitive Impairment Observed: No cognitive impairment observed  Cognitive Impairment Reported: No cognitive impairment reported by patient or family    Home Safety Risk Factors: None    Patient Care Team:  Carly Morales DO as PCP - General  Carly Morales DO as PCP - O Medicare Advantage PCP  Melvin Ayala MD as Consulting Physician (Cardiology)     Review of Systems   Constitutional:  Negative for activity change, appetite change, fatigue and fever.   HENT:  Negative for congestion.    Respiratory:  Negative for cough, choking and chest tightness.    Cardiovascular:  Negative for chest pain, palpitations and leg swelling.   Musculoskeletal:  Negative for arthralgias, back pain  "and gait problem.   Skin:  Negative for color change and pallor.   Neurological:  Negative for dizziness, facial asymmetry, light-headedness and headaches.       Objective   Vitals:  /64 (BP Location: Right arm, Patient Position: Sitting)   Pulse 82   Ht 1.549 m (5' 1\")   Wt 64.1 kg (141 lb 6.4 oz)   BMI 26.72 kg/m²       Physical Exam  Constitutional:       General: She is not in acute distress.     Appearance: Normal appearance. She is not toxic-appearing.   HENT:      Head: Normocephalic.      Right Ear: Tympanic membrane, ear canal and external ear normal.      Left Ear: Tympanic membrane, ear canal and external ear normal.      Nose: Nose normal.      Mouth/Throat:      Pharynx: Oropharynx is clear.   Eyes:      Conjunctiva/sclera: Conjunctivae normal.      Pupils: Pupils are equal, round, and reactive to light.   Cardiovascular:      Rate and Rhythm: Normal rate and regular rhythm.      Pulses: Normal pulses.      Heart sounds: Normal heart sounds.   Pulmonary:      Effort: No respiratory distress.      Breath sounds: No wheezing, rhonchi or rales.   Abdominal:      General: Bowel sounds are normal. There is no distension.      Palpations: Abdomen is soft.      Tenderness: There is no abdominal tenderness.   Musculoskeletal:         General: No swelling or tenderness.      Cervical back: No tenderness.   Skin:     Findings: No lesion or rash.   Neurological:      General: No focal deficit present.      Mental Status: She is alert and oriented to person, place, and time. Mental status is at baseline.      Gait: Gait normal.   Psychiatric:         Mood and Affect: Mood normal.         Behavior: Behavior normal.         Thought Content: Thought content normal.         Judgment: Judgment normal.       Cardiovascular risk discussed and, if needed, lifestyle modifications recommended, including nutritional choices, exercise, and elimination of habits contributing to risk.  We agreed on a plan to reduce " current cardiovascular risk.  See the ASCVD risk  plus for data discussed regarding risk and risk reduction opportunities.  Aspirin use/disuse was discussed after reviewing updated guidelines.    Time Statement: Total face to face time spent on ASCVD was 5 minutes with 10 minutes spent in counseling, including the explanation.  Assessment/Plan   Problem List Items Addressed This Visit       Acquired hypothyroidism    Arterial bruit    Hyperlipidemia    Chemotherapy-induced peripheral neuropathy (Multi)    Current Assessment & Plan     Started July 2012, stable         Osteopenia    Vitamin D deficiency    Atherosclerosis of aorta (CMS-HCC)    Current Assessment & Plan     stable         Abnormal complete blood count    Elevated liver enzymes     Other Visit Diagnoses       Healthcare maintenance    -  Primary        1. Patient's blood work discussed at this office visit  2. Patient's LDL goal less than 130, current LDL 86  3.  Patient's triglycerides elevated at 181, goal triglycerides less than 150 please start on type IV diet  4.  Patient's white blood cell count elevated with shift in Absolute lymphocytes we will discuss at this office visit  5. Patient's thyroid level is back to normal  6. Patient's vitamin D level is back to normal, continue on vitamin D replacement daily  7. Patient's liver enzymes back to normal, continue to monitor  8. Mammogram negative 5/2024  9. DEXA 2021 osteopenia due 2026  10. Colonoscopy 2019 D. Salvino due 2029  11. Patient to call if questions or concerns

## 2024-12-16 NOTE — ACP (ADVANCE CARE PLANNING)
Confirming Previous Code Status:   Advance Care Planning Note     Discussion Date: 12/16/24   Discussion Participants: patient    The patient wishes to discuss Advance Care Planning today and the following is a brief summary of our discussion.     Patient has capacity to make their own medical decisions: Yes  Health Care Agent/Surrogate Decision Maker documented in chart: Yes    Documents on file and valid:  Advance Directive/Living Will: Yes   Health Care Power of : Yes  Other:     Communication of Medical Status/Prognosis:   stable    Communication of Treatment Goals/Options:   stable    Treatment Decisions  Goals of Care: survival is prioritized, if goals for quality or survival can reasonably be achieved     Follow Up Plan  stable  Team Members  stable  Time Statement: Total face to face time spent on advance care planning was 5 minutes with 16 minutes spent in counseling, including the explanation.    Carly Morales DO  12/16/2024 8:12 AM

## 2025-01-17 ENCOUNTER — OFFICE VISIT (OUTPATIENT)
Dept: PRIMARY CARE | Facility: CLINIC | Age: 70
End: 2025-01-17
Payer: MEDICARE

## 2025-01-17 VITALS
OXYGEN SATURATION: 95 % | DIASTOLIC BLOOD PRESSURE: 58 MMHG | TEMPERATURE: 97.5 F | BODY MASS INDEX: 26.73 KG/M2 | HEIGHT: 61 IN | WEIGHT: 141.6 LBS | SYSTOLIC BLOOD PRESSURE: 93 MMHG | HEART RATE: 91 BPM

## 2025-01-17 DIAGNOSIS — J01.10 ACUTE NON-RECURRENT FRONTAL SINUSITIS: Primary | ICD-10-CM

## 2025-01-17 PROCEDURE — 1036F TOBACCO NON-USER: CPT | Performed by: FAMILY MEDICINE

## 2025-01-17 PROCEDURE — 99214 OFFICE O/P EST MOD 30 MIN: CPT | Performed by: FAMILY MEDICINE

## 2025-01-17 PROCEDURE — 1159F MED LIST DOCD IN RCRD: CPT | Performed by: FAMILY MEDICINE

## 2025-01-17 PROCEDURE — 1123F ACP DISCUSS/DSCN MKR DOCD: CPT | Performed by: FAMILY MEDICINE

## 2025-01-17 PROCEDURE — 3008F BODY MASS INDEX DOCD: CPT | Performed by: FAMILY MEDICINE

## 2025-01-17 PROCEDURE — 1160F RVW MEDS BY RX/DR IN RCRD: CPT | Performed by: FAMILY MEDICINE

## 2025-01-17 RX ORDER — CEPHALEXIN 500 MG/1
500 CAPSULE ORAL 2 TIMES DAILY
Qty: 20 CAPSULE | Refills: 0 | Status: SHIPPED | OUTPATIENT
Start: 2025-01-17 | End: 2025-01-27

## 2025-01-17 ASSESSMENT — ENCOUNTER SYMPTOMS
CHOKING: 0
ACTIVITY CHANGE: 0
DIZZINESS: 0
COLOR CHANGE: 0
ARTHRALGIAS: 0
FATIGUE: 0
COUGH: 0
LIGHT-HEADEDNESS: 0
APPETITE CHANGE: 0
PALPITATIONS: 0
FACIAL ASYMMETRY: 0
HEADACHES: 0
FEVER: 0
CHEST TIGHTNESS: 0
BACK PAIN: 0

## 2025-01-17 NOTE — PROGRESS NOTES
"Subjective   Patient ID: Tatum Molina is a 69 y.o. female who presents for URI (Possible sinus infection started a week and a half ago.).    URI   Associated symptoms include congestion. Pertinent negatives include no chest pain, coughing or headaches.    Patient reports that she has had cough, congestion for about 5 days. She tested positive for covid on December 27th.     Review of Systems   Constitutional:  Negative for activity change, appetite change, fatigue and fever.   HENT:  Positive for congestion.    Respiratory:  Negative for cough, choking and chest tightness.    Cardiovascular:  Negative for chest pain, palpitations and leg swelling.   Musculoskeletal:  Negative for arthralgias, back pain and gait problem.   Skin:  Negative for color change and pallor.   Neurological:  Negative for dizziness, facial asymmetry, light-headedness and headaches.       Objective   BP 93/58   Pulse 91   Temp 36.4 °C (97.5 °F) (Oral)   Ht 1.549 m (5' 1\")   Wt 64.2 kg (141 lb 9.6 oz)   LMP  (LMP Unknown)   SpO2 95%   BMI 26.76 kg/m²   BSA Body surface area is 1.66 meters squared.      Physical Exam  Constitutional:       General: She is not in acute distress.     Appearance: Normal appearance. She is not toxic-appearing.   HENT:      Head: Normocephalic.      Right Ear: Tympanic membrane, ear canal and external ear normal.      Left Ear: Tympanic membrane, ear canal and external ear normal.   Eyes:      Conjunctiva/sclera: Conjunctivae normal.      Pupils: Pupils are equal, round, and reactive to light.   Cardiovascular:      Rate and Rhythm: Normal rate and regular rhythm.      Pulses: Normal pulses.      Heart sounds: Normal heart sounds.   Pulmonary:      Effort: No respiratory distress.      Breath sounds: No wheezing, rhonchi or rales.   Musculoskeletal:         General: No swelling or tenderness.   Skin:     Findings: No lesion or rash.   Neurological:      General: No focal deficit present.      Mental Status: " She is alert and oriented to person, place, and time. Mental status is at baseline.      Gait: Gait normal.   Psychiatric:         Mood and Affect: Mood normal.         Behavior: Behavior normal.         Thought Content: Thought content normal.         Judgment: Judgment normal.       Lab on 12/02/2024   Component Date Value Ref Range Status    WBC 12/02/2024 11.8 (H)  4.4 - 11.3 x10*3/uL Final    nRBC 12/02/2024 0.0  0.0 - 0.0 /100 WBCs Final    RBC 12/02/2024 3.52 (L)  4.00 - 5.20 x10*6/uL Final    Hemoglobin 12/02/2024 12.6  12.0 - 16.0 g/dL Final    Hematocrit 12/02/2024 37.9  36.0 - 46.0 % Final    MCV 12/02/2024 108 (H)  80 - 100 fL Final    MCH 12/02/2024 35.8 (H)  26.0 - 34.0 pg Final    MCHC 12/02/2024 33.2  32.0 - 36.0 g/dL Final    RDW 12/02/2024 12.8  11.5 - 14.5 % Final    Platelets 12/02/2024 139 (L)  150 - 450 x10*3/uL Final    Immature Granulocytes %, Automated 12/02/2024 0.1  0.0 - 0.9 % Final    Immature Granulocyte Count (IG) includes promyelocytes, myelocytes and metamyelocytes but does not include bands. Percent differential counts (%) should be interpreted in the context of the absolute cell counts (cells/UL).    Immature Granulocytes Absolute, Au* 12/02/2024 0.01  0.00 - 0.50 x10*3/uL Final    Glucose 12/02/2024 97  74 - 99 mg/dL Final    Sodium 12/02/2024 139  136 - 145 mmol/L Final    Potassium 12/02/2024 3.9  3.5 - 5.3 mmol/L Final    Chloride 12/02/2024 104  98 - 107 mmol/L Final    Bicarbonate 12/02/2024 28  21 - 32 mmol/L Final    Anion Gap 12/02/2024 11  10 - 20 mmol/L Final    Urea Nitrogen 12/02/2024 11  6 - 23 mg/dL Final    Creatinine 12/02/2024 0.66  0.50 - 1.05 mg/dL Final    eGFR 12/02/2024 >90  >60 mL/min/1.73m*2 Final    Calculations of estimated GFR are performed using the 2021 CKD-EPI Study Refit equation without the race variable for the IDMS-Traceable creatinine methods.  https://jasn.asnjournals.org/content/early/2021/09/22/ASN.8200612426    Calcium 12/02/2024 9.2  8.6 -  10.6 mg/dL Final    Albumin 12/02/2024 4.0  3.4 - 5.0 g/dL Final    Alkaline Phosphatase 12/02/2024 38  33 - 136 U/L Final    Total Protein 12/02/2024 6.4  6.4 - 8.2 g/dL Final    AST 12/02/2024 22  9 - 39 U/L Final    Bilirubin, Total 12/02/2024 0.5  0.0 - 1.2 mg/dL Final    ALT 12/02/2024 22  7 - 45 U/L Final    Patients treated with Sulfasalazine may generate falsely decreased results for ALT.    Cholesterol 12/02/2024 170  0 - 199 mg/dL Final          Age      Desirable   Borderline High   High     0-19 Y     0 - 169       170 - 199     >/= 200    20-24 Y     0 - 189       190 - 224     >/= 225         >24 Y     0 - 199       200 - 239     >/= 240   **All ranges are based on fasting samples. Specific   therapeutic targets will vary based on patient-specific   cardiac risk.    Pediatric guidelines reference:Pediatrics 2011, 128(S5).Adult guidelines reference: NCEP ATPIII Guidelines,GAYE 2001, 258:2486-97    Venipuncture immediately after or during the administration of Metamizole may lead to falsely low results. Testing should be performed immediately prior to Metamizole dosing.    HDL-Cholesterol 12/02/2024 47.7  mg/dL Final      Age       Very Low   Low     Normal    High    0-19 Y    < 35      < 40     40-45     ----  20-24 Y    ----     < 40      >45      ----        >24 Y      ----     < 40     40-60      >60      Cholesterol/HDL Ratio 12/02/2024 3.6   Final      Ref Values  Desirable  < 3.4  High Risk  > 5.0    LDL Calculated 12/02/2024 86  <=99 mg/dL Final                                Near   Borderline      AGE      Desirable  Optimal    High     High     Very High     0-19 Y     0 - 109     ---    110-129   >/= 130     ----    20-24 Y     0 - 119     ---    120-159   >/= 160     ----      >24 Y     0 -  99   100-129  130-159   160-189     >/=190      VLDL 12/02/2024 36  0 - 40 mg/dL Final    Triglycerides 12/02/2024 181 (H)  0 - 149 mg/dL Final    Age              Desirable        Borderline          High        Very High  SEX:B           mg/dL             mg/dL               mg/dL      mg/dL  <=14D                       ----               ----        ----  15D-365D                    ----               ----        ----  1Y-9Y           0-74               75-99             >=100       ----  10Y-19Y        0-89                            >=130       ----  20Y-24Y        0-114             115-149             >=150      ----  >= 25Y         0-149             150-199             200-499    >=500      Venipuncture immediately after or during the administration of Metamizole may lead to falsely low results. Testing should be performed immediately prior to Metamizole dosing.    Non HDL Cholesterol 12/02/2024 122  0 - 149 mg/dL Final          Age       Desirable   Borderline High   High     Very High     0-19 Y     0 - 119       120 - 144     >/= 145    >/= 160    20-24 Y     0 - 149       150 - 189     >/= 190      ----         >24 Y    30 mg/dL above LDL Cholesterol goal      Thyroid Stimulating Hormone 12/02/2024 2.77  0.44 - 3.98 mIU/L Final    Vitamin D, 25-Hydroxy, Total 12/02/2024 41  30 - 100 ng/mL Final    Neutrophils %, Manual 12/02/2024 21.0  40.0 - 80.0 % Final    Percent differential counts (%) should be interpreted in the context of the absolute cell counts (cells/uL).    Lymphocytes %, Manual 12/02/2024 58.8  13.0 - 44.0 % Final    Monocytes %, Manual 12/02/2024 4.2  2.0 - 10.0 % Final    Eosinophils %, Manual 12/02/2024 1.7  0.0 - 6.0 % Final    Basophils %, Manual 12/02/2024 1.7  0.0 - 2.0 % Final    Atypical Lymphocytes %, Manual 12/02/2024 12.6  0.0 - 2.0 % Final    Seg Neutrophils Absolute, Manual 12/02/2024 2.48  1.60 - 5.00 x10*3/uL Final    Lymphocytes Absolute, Manual 12/02/2024 6.94 (H)  0.80 - 3.00 x10*3/uL Final    Monocytes Absolute, Manual 12/02/2024 0.50  0.05 - 0.80 x10*3/uL Final    Eosinophils Absolute, Manual 12/02/2024 0.20  0.00 - 0.40 x10*3/uL Final    Basophils  Absolute, Manual 12/02/2024 0.20 (H)  0.00 - 0.10 x10*3/uL Final    Atypical Lymphs Absolute, Manual 12/02/2024 1.49 (H)  0.00 - 0.30 x10*3/uL Final    Total Cells Counted 12/02/2024 119   Final    RBC Morphology 12/02/2024 No significant RBC morphology present   Final   Office Visit on 09/19/2024   Component Date Value Ref Range Status    POC Color, Urine 09/19/2024 Yellow  Straw, Yellow, Light-Yellow Final    POC Appearance, Urine 09/19/2024 Clear  Clear Final    POC Glucose, Urine 09/19/2024 NEGATIVE  NEGATIVE mg/dl Final    POC Bilirubin, Urine 09/19/2024 NEGATIVE  NEGATIVE Final    POC Ketones, Urine 09/19/2024 NEGATIVE  NEGATIVE mg/dl Final    POC Specific Gravity, Urine 09/19/2024 1.020  1.005 - 1.035 Final    POC Blood, Urine 09/19/2024 NEGATIVE  NEGATIVE Final    POC PH, Urine 09/19/2024 7.0  No Reference Range Established PH Final    POC Protein, Urine 09/19/2024 NEGATIVE  NEGATIVE, 30 (1+) mg/dl Final    POC Urobilinogen, Urine 09/19/2024 0.2  0.2, 1.0 EU/DL Final    Poc Nitrite, Urine 09/19/2024 NEGATIVE  NEGATIVE Final    POC Leukocytes, Urine 09/19/2024 SMALL (1+) (A)  NEGATIVE Final    Urine Culture 09/19/2024 No significant growth   Final   Office Visit on 03/19/2024   Component Date Value Ref Range Status    RSV PCR 03/19/2024 Not Detected  Not Detected Final    Coronavirus 2019, PCR 03/19/2024 Not Detected  Not Detected Final    Flu A Result 03/19/2024 Not Detected  Not Detected Final    Flu B Result 03/19/2024 Not Detected  Not Detected Final     Current Outpatient Medications on File Prior to Visit   Medication Sig Dispense Refill    albuterol 90 mcg/actuation inhaler Inhale.      calcium carbonate (Calcium 600) 600 mg calcium (1,500 mg) tablet Take 1 tablet (600 mg) by mouth 2 times a day with meals.      cholecalciferol (Vitamin D-3) 25 MCG (1000 UT) capsule Take 1 capsule (25 mcg) by mouth 2 times a day.      ibandronate (Boniva) 150 mg tablet Take 1 tablet (150 mg) by mouth every 30 (thirty)  days.      levothyroxine (Synthroid, Levoxyl) 50 mcg tablet TAKE 1 TABLET BY MOUTH DAILY except TAKE 2 TABLETS BY MOUTH SUNDAY 102 tablet 1    metoprolol succinate XL (Toprol-XL) 25 mg 24 hr tablet Take 1 tablet (25 mg) by mouth once daily. DO NOT CRUSH OR CHEW 90 tablet 0    pravastatin (Pravachol) 20 mg tablet Take 1 tablet (20 mg) by mouth once daily. 90 tablet 0    tobramycin (Tobrex) 0.3 % ophthalmic solution Administer 2 drops into the right eye 3 times a day. 5 mL 0    pilocarpine (Salagen) 5 mg tablet Take 1 tablet (5 mg) by mouth 4 times a day. (Patient not taking: Reported on 1/17/2025)       No current facility-administered medications on file prior to visit.     No images are attached to the encounter.            Assessment/Plan   Diagnoses and all orders for this visit:  Acute non-recurrent frontal sinusitis  -     cephalexin (Keflex) 500 mg capsule; Take 1 capsule (500 mg) by mouth 2 times a day for 10 days.    Patient to start on antibiotics  Patient to call if questions or concerns

## 2025-01-20 ENCOUNTER — TELEPHONE (OUTPATIENT)
Dept: PRIMARY CARE | Facility: CLINIC | Age: 70
End: 2025-01-20
Payer: MEDICARE

## 2025-01-20 DIAGNOSIS — R05.1 ACUTE COUGH: ICD-10-CM

## 2025-01-20 RX ORDER — ALBUTEROL SULFATE 90 UG/1
2 INHALANT RESPIRATORY (INHALATION)
Qty: 18 G | Refills: 0 | Status: SHIPPED | OUTPATIENT
Start: 2025-01-20

## 2025-01-20 NOTE — TELEPHONE ENCOUNTER
Pt needs a refill on albuterol 90 mcg/actuation inhaler     [facility]  [address] [address]   Phone: [phone]   Fax: [phone]

## 2025-01-22 PROBLEM — M89.9 DISORDER OF BONE, UNSPECIFIED: Status: RESOLVED | Noted: 2023-04-12 | Resolved: 2025-01-22

## 2025-02-06 ENCOUNTER — APPOINTMENT (OUTPATIENT)
Dept: CARDIOLOGY | Facility: CLINIC | Age: 70
End: 2025-02-06
Payer: MEDICARE

## 2025-02-13 ENCOUNTER — OFFICE VISIT (OUTPATIENT)
Dept: CARDIOLOGY | Facility: CLINIC | Age: 70
End: 2025-02-13
Payer: MEDICARE

## 2025-02-13 VITALS
DIASTOLIC BLOOD PRESSURE: 64 MMHG | WEIGHT: 140 LBS | SYSTOLIC BLOOD PRESSURE: 110 MMHG | HEART RATE: 88 BPM | BODY MASS INDEX: 26.45 KG/M2 | OXYGEN SATURATION: 97 %

## 2025-02-13 DIAGNOSIS — E78.5 HYPERLIPIDEMIA, UNSPECIFIED HYPERLIPIDEMIA TYPE: ICD-10-CM

## 2025-02-13 DIAGNOSIS — R00.2 PALPITATIONS: Primary | ICD-10-CM

## 2025-02-13 DIAGNOSIS — I70.0 ATHEROSCLEROSIS OF AORTA (CMS-HCC): ICD-10-CM

## 2025-02-13 PROCEDURE — 1123F ACP DISCUSS/DSCN MKR DOCD: CPT | Performed by: STUDENT IN AN ORGANIZED HEALTH CARE EDUCATION/TRAINING PROGRAM

## 2025-02-13 PROCEDURE — 99213 OFFICE O/P EST LOW 20 MIN: CPT | Mod: 25 | Performed by: STUDENT IN AN ORGANIZED HEALTH CARE EDUCATION/TRAINING PROGRAM

## 2025-02-13 PROCEDURE — 93005 ELECTROCARDIOGRAM TRACING: CPT | Performed by: STUDENT IN AN ORGANIZED HEALTH CARE EDUCATION/TRAINING PROGRAM

## 2025-02-13 PROCEDURE — 93010 ELECTROCARDIOGRAM REPORT: CPT | Performed by: STUDENT IN AN ORGANIZED HEALTH CARE EDUCATION/TRAINING PROGRAM

## 2025-02-13 PROCEDURE — 1160F RVW MEDS BY RX/DR IN RCRD: CPT | Performed by: STUDENT IN AN ORGANIZED HEALTH CARE EDUCATION/TRAINING PROGRAM

## 2025-02-13 PROCEDURE — 1159F MED LIST DOCD IN RCRD: CPT | Performed by: STUDENT IN AN ORGANIZED HEALTH CARE EDUCATION/TRAINING PROGRAM

## 2025-02-13 PROCEDURE — 99213 OFFICE O/P EST LOW 20 MIN: CPT | Performed by: STUDENT IN AN ORGANIZED HEALTH CARE EDUCATION/TRAINING PROGRAM

## 2025-02-13 RX ORDER — CEVIMELINE HYDROCHLORIDE 30 MG/1
1 CAPSULE ORAL
COMMUNITY
Start: 2024-10-04 | End: 2025-03-07

## 2025-02-13 ASSESSMENT — ENCOUNTER SYMPTOMS
NEUROLOGICAL NEGATIVE: 1
PALPITATIONS: 1
ALLERGIC/IMMUNOLOGIC NEGATIVE: 1
GASTROINTESTINAL NEGATIVE: 1
CONSTITUTIONAL NEGATIVE: 1
ENDOCRINE NEGATIVE: 1
EYES NEGATIVE: 1
RESPIRATORY NEGATIVE: 1
HEMATOLOGIC/LYMPHATIC NEGATIVE: 1
PSYCHIATRIC NEGATIVE: 1

## 2025-02-13 NOTE — PATIENT INSTRUCTIONS
Thank you for your visit today. Please contact our office (via MyChart or phone) with any additional questions.     [facility] [place]    Didi, RN/Clinic Nurse for:    Dr. Lucy Crockett    [address], [address]  [place] [address]    Phone: [phone] Press Option 5 then Option 3 to speak with the Clinic Nurse (Didi)    _____    To Reach:    Billing Questions -    [phone]  Scheduling / Rescheduling -  Option 1  Refills / Medication Requests -  Option 3  General Office / [place] -  Option 4  Results -     Option 6  Medical Records -    Option 7  Repeat Options -    Option 9

## 2025-02-13 NOTE — PROGRESS NOTES
Cardiology Outpatient Follow-up Visit    Reason for visit: palpitations     HPI: Tatum Molina is a 69 y.o.  female who presents for a follow-up visit. Past medical history of right breast cancer s/p chemo + radiation (~ 11 years ago), DLD, heart palpitations, and hypothyroidism.      Previously seen in ED for rapid heart rate per her apple watch (showed brief burst of -180s < 1 minute).  Of note patient has history of chronic hip pain for which she gets kenalog injections. Patient followed up with her PCP Dr. Morales who ordered a TTE and holter monitor.     Patient presented to cardiology clinic on 11/16/23. Patient notes intermittent heart palpitations over past couple of months. No chest pain, dyspnea, syncope, pre-syncope, fever / chills, nausea / vomiting, or pedal. TTE showed LVEF 55-60%, mild to moderate AI.  Holter monitor showed sinus tachycardia.  Patient continues to have intermittent palpitations, unrelated to exertion.      Tatum presented to cardiology clinic on 2/6/2024.  Heart palpitations are currently well-controlled on metoprolol.  Patient appears euvolemic on exam.  No active cardiovascular complaints at this time.    Past Medical History:   - As above    Surgical History:   She has a past surgical history that includes Gallbladder surgery (09/27/2013); Hysterectomy (09/27/2013); Breast lumpectomy (09/27/2013); Other surgical history (09/27/2013); Other surgical history (12/19/2018); and Eye surgery.    Family History:   Family History   Problem Relation Name Age of Onset    Hypertension Mother          Passed at age 68    Hyperlipidemia Mother      Hearing loss Father Thang Mink         Passed at age 62    Hyperlipidemia Father Thang Mink     Hypertension Father Thang Mink     Hypertension Sister      No Known Problems Sister      No Known Problems Brother       Mother- Aortic aneurysm, TIA, HTN  Father- HTN, DLD, ASHD s/p coronary stenting   Sister- HTN, ASHD s/p  coronary stenting; Hx of ablation for arrhythmia     Allergies:  Amoxicillin-pot clavulanate, Atorvastatin calcium, Pilocarpine, Animal dander, Bee pollen, and Perfume     Social History:   - Former smoker (quit 40 years ago); rare alcohol use; no illicit drug use    Prior Cardiovascular Testing (personally reviewed):     ECG (2/13/2025)- normal sinus rhythm    TTE (10/27/2023)  1. Left ventricular systolic function is normal with a 55-60% estimated ejection fraction.  2. Spectral Doppler shows an impaired relaxation pattern of left ventricular diastolic filling.  3. Mild to moderate aortic valve regurgitation.    Holter monitor (10/26/2023)- Sinus tachycardia     ECG (10/18/2023)- sinus rhythm    Review of Systems:  Review of Systems   Constitutional: Negative.   HENT: Negative.     Eyes: Negative.    Cardiovascular:  Positive for palpitations.   Respiratory: Negative.     Endocrine: Negative.    Hematologic/Lymphatic: Negative.    Musculoskeletal:  Positive for arthritis.   Gastrointestinal: Negative.    Genitourinary: Negative.    Neurological: Negative.    Psychiatric/Behavioral: Negative.     Allergic/Immunologic: Negative.        Objective     Outpatient Medications:    Current Outpatient Medications:     albuterol 90 mcg/actuation inhaler, Inhale 2 puffs 4 times a day., Disp: 18 g, Rfl: 0    calcium carbonate (Calcium 600) 600 mg calcium (1,500 mg) tablet, Take 1 tablet (600 mg) by mouth 2 times a day with meals., Disp: , Rfl:     cevimeline (Evoxac) 30 mg capsule, Take 1 capsule (30 mg) by mouth 3 times a day., Disp: , Rfl:     cholecalciferol (Vitamin D-3) 25 MCG (1000 UT) capsule, Take 1 capsule (25 mcg) by mouth 2 times a day., Disp: , Rfl:     ibandronate (Boniva) 150 mg tablet, Take 1 tablet (150 mg) by mouth every 30 (thirty) days., Disp: , Rfl:     levothyroxine (Synthroid, Levoxyl) 50 mcg tablet, TAKE 1 TABLET BY MOUTH DAILY except TAKE 2 TABLETS BY MOUTH SUNDAY, Disp: 102 tablet, Rfl: 1     metoprolol succinate XL (Toprol-XL) 25 mg 24 hr tablet, Take 1 tablet (25 mg) by mouth once daily. DO NOT CRUSH OR CHEW, Disp: 90 tablet, Rfl: 0    pravastatin (Pravachol) 20 mg tablet, Take 1 tablet (20 mg) by mouth once daily., Disp: 90 tablet, Rfl: 0    tobramycin (Tobrex) 0.3 % ophthalmic solution, Administer 2 drops into the right eye 3 times a day., Disp: 5 mL, Rfl: 0    pilocarpine (Salagen) 5 mg tablet, Take 1 tablet (5 mg) by mouth 4 times a day. (Patient not taking: Reported on 2/13/2025), Disp: , Rfl:      Last Recorded Vitals  /64 (BP Location: Right arm, Patient Position: Sitting)   Pulse 88   Wt 63.5 kg (140 lb)   LMP  (LMP Unknown)   SpO2 97%   BMI 26.45 kg/m²     Physical Exam:  Physical Exam  Constitutional:       General: She is not in acute distress.  HENT:      Head: Normocephalic.      Mouth/Throat:      Mouth: Mucous membranes are moist.   Eyes:      Extraocular Movements: Extraocular movements intact.      Conjunctiva/sclera: Conjunctivae normal.   Neck:      Vascular: No JVD.   Cardiovascular:      Rate and Rhythm: Normal rate and regular rhythm.      Heart sounds: No murmur heard.  Pulmonary:      Effort: Pulmonary effort is normal. No respiratory distress.      Breath sounds: Normal breath sounds.   Abdominal:      General: There is no distension.   Musculoskeletal:         General: No swelling.   Skin:     General: Skin is warm and dry.   Neurological:      General: No focal deficit present.      Mental Status: She is alert.      Cranial Nerves: No cranial nerve deficit.      Motor: No weakness.   Psychiatric:         Mood and Affect: Mood normal.         Behavior: Behavior normal.         Lab Review:    Lab Results   Component Value Date    GLUCOSE 97 12/02/2024    CALCIUM 9.2 12/02/2024     12/02/2024    K 3.9 12/02/2024    CO2 28 12/02/2024     12/02/2024    BUN 11 12/02/2024    CREATININE 0.66 12/02/2024       Lab Results   Component Value Date    WBC 11.8 (H)  12/02/2024    HGB 12.6 12/02/2024    HCT 37.9 12/02/2024     (H) 12/02/2024     (L) 12/02/2024       Lab Results   Component Value Date    CHOL 170 12/02/2024    CHOL 172 12/01/2022    CHOL 166 12/14/2021     Lab Results   Component Value Date    HDL 47.7 12/02/2024    HDL 63.9 12/01/2022    HDL 65.8 12/14/2021     Lab Results   Component Value Date    LDLCALC 86 12/02/2024     Lab Results   Component Value Date    TRIG 181 (H) 12/02/2024    TRIG 100 12/01/2022    TRIG 91 12/14/2021       Lab Results   Component Value Date    TSH 2.77 12/02/2024       Assessment:   68 y.o.  female who presents for a follow-up visit. Past medical history of right breast cancer s/p chemo + radiation (~ 11 years ago), DLD, heart palpitations, and hypothyroidism.      Tatum presented to cardiology clinic on 2/6/2024.  Heart palpitations are currently well-controlled on metoprolol.  Patient appears euvolemic on exam.  No active cardiovascular complaints at this time.  Continue cardiac management as below.    Overall Plan:  1.  Heart palpitations-currently well-controlled on beta-blockade; uptitrate beta-blockade as tolerated    2.  Mild to moderate AI-currently asymptomatic; monitor clinically and with serial imaging; repeat transthoracic echocardiogram fall 2025    3.  Hypothyroidism-continue levothyroxine as per primary care    4. DLD- continue pravastatin; dietary and lifestyle modifications     Disposition: Return to cardiology clinic in 12 months or earlier if needed    Melvin Ayala MD

## 2025-02-14 LAB
ATRIAL RATE: 83 BPM
P AXIS: 49 DEGREES
P OFFSET: 192 MS
P ONSET: 144 MS
PR INTERVAL: 160 MS
Q ONSET: 224 MS
QRS COUNT: 13 BEATS
QRS DURATION: 82 MS
QT INTERVAL: 374 MS
QTC CALCULATION(BAZETT): 439 MS
QTC FREDERICIA: 417 MS
R AXIS: -24 DEGREES
T AXIS: 65 DEGREES
T OFFSET: 411 MS
VENTRICULAR RATE: 83 BPM

## 2025-02-16 DIAGNOSIS — E78.5 HYPERLIPIDEMIA, UNSPECIFIED HYPERLIPIDEMIA TYPE: ICD-10-CM

## 2025-02-17 ENCOUNTER — TELEPHONE (OUTPATIENT)
Dept: PRIMARY CARE | Facility: CLINIC | Age: 70
End: 2025-02-17
Payer: MEDICARE

## 2025-02-17 DIAGNOSIS — E03.9 ACQUIRED HYPOTHYROIDISM: ICD-10-CM

## 2025-02-17 RX ORDER — LEVOTHYROXINE SODIUM 50 UG/1
TABLET ORAL
Qty: 102 TABLET | Refills: 1 | Status: SHIPPED | OUTPATIENT
Start: 2025-02-17

## 2025-02-17 RX ORDER — PRAVASTATIN SODIUM 20 MG/1
20 TABLET ORAL DAILY
Qty: 90 TABLET | Refills: 0 | Status: SHIPPED | OUTPATIENT
Start: 2025-02-17

## 2025-02-17 NOTE — TELEPHONE ENCOUNTER
[facility] med refill request:  [phone] / fax [phone]    Levothyroxine  50 mcg  Take 1 tablet by mouth daily except take 2 tablets on Sundays  #102  Refill: 1

## 2025-02-19 DIAGNOSIS — R00.2 HEART PALPITATIONS: ICD-10-CM

## 2025-02-24 DIAGNOSIS — E03.9 ACQUIRED HYPOTHYROIDISM: ICD-10-CM

## 2025-02-24 DIAGNOSIS — E78.5 HYPERLIPIDEMIA, UNSPECIFIED HYPERLIPIDEMIA TYPE: ICD-10-CM

## 2025-02-24 RX ORDER — LEVOTHYROXINE SODIUM 50 UG/1
TABLET ORAL
Qty: 102 TABLET | Refills: 1 | Status: SHIPPED | OUTPATIENT
Start: 2025-02-24

## 2025-02-24 RX ORDER — PRAVASTATIN SODIUM 20 MG/1
20 TABLET ORAL DAILY
Qty: 90 TABLET | Refills: 0 | Status: SHIPPED | OUTPATIENT
Start: 2025-02-24

## 2025-02-24 RX ORDER — METOPROLOL SUCCINATE 25 MG/1
25 TABLET, EXTENDED RELEASE ORAL DAILY
Qty: 90 TABLET | Refills: 0 | Status: SHIPPED | OUTPATIENT
Start: 2025-02-24

## 2025-02-24 RX ORDER — IBANDRONATE SODIUM 150 MG/1
150 TABLET, FILM COATED ORAL
Qty: 12 TABLET | Refills: 1 | Status: SHIPPED | OUTPATIENT
Start: 2025-02-24

## 2025-03-07 ENCOUNTER — APPOINTMENT (OUTPATIENT)
Dept: PRIMARY CARE | Facility: CLINIC | Age: 70
End: 2025-03-07
Payer: MEDICARE

## 2025-03-07 ENCOUNTER — OFFICE VISIT (OUTPATIENT)
Dept: PRIMARY CARE | Facility: CLINIC | Age: 70
End: 2025-03-07
Payer: MEDICARE

## 2025-03-07 VITALS
DIASTOLIC BLOOD PRESSURE: 73 MMHG | HEART RATE: 82 BPM | SYSTOLIC BLOOD PRESSURE: 126 MMHG | TEMPERATURE: 98.2 F | BODY MASS INDEX: 26.62 KG/M2 | OXYGEN SATURATION: 95 % | HEIGHT: 61 IN | WEIGHT: 141 LBS

## 2025-03-07 DIAGNOSIS — J22 LOWER RESPIRATORY INFECTION: Primary | ICD-10-CM

## 2025-03-07 PROCEDURE — 1159F MED LIST DOCD IN RCRD: CPT | Performed by: NURSE PRACTITIONER

## 2025-03-07 PROCEDURE — 1123F ACP DISCUSS/DSCN MKR DOCD: CPT | Performed by: NURSE PRACTITIONER

## 2025-03-07 PROCEDURE — 3008F BODY MASS INDEX DOCD: CPT | Performed by: NURSE PRACTITIONER

## 2025-03-07 PROCEDURE — 99214 OFFICE O/P EST MOD 30 MIN: CPT | Performed by: NURSE PRACTITIONER

## 2025-03-07 PROCEDURE — 1036F TOBACCO NON-USER: CPT | Performed by: NURSE PRACTITIONER

## 2025-03-07 RX ORDER — AZITHROMYCIN 250 MG/1
TABLET, FILM COATED ORAL
Qty: 6 TABLET | Refills: 0 | Status: SHIPPED | OUTPATIENT
Start: 2025-03-07

## 2025-03-07 RX ORDER — BENZONATATE 100 MG/1
100 CAPSULE ORAL 3 TIMES DAILY PRN
Qty: 42 CAPSULE | Refills: 0 | Status: SHIPPED | OUTPATIENT
Start: 2025-03-07 | End: 2025-04-06

## 2025-03-07 RX ORDER — PREDNISONE 20 MG/1
40 TABLET ORAL DAILY
Qty: 10 TABLET | Refills: 0 | Status: SHIPPED | OUTPATIENT
Start: 2025-03-07 | End: 2025-03-12

## 2025-03-07 ASSESSMENT — PATIENT HEALTH QUESTIONNAIRE - PHQ9
2. FEELING DOWN, DEPRESSED OR HOPELESS: NOT AT ALL
SUM OF ALL RESPONSES TO PHQ9 QUESTIONS 1 AND 2: 0
1. LITTLE INTEREST OR PLEASURE IN DOING THINGS: NOT AT ALL

## 2025-03-07 ASSESSMENT — ENCOUNTER SYMPTOMS
DEPRESSION: 0
LOSS OF SENSATION IN FEET: 0
OCCASIONAL FEELINGS OF UNSTEADINESS: 0

## 2025-03-07 NOTE — PROGRESS NOTES
"Subjective   Patient ID: Tatum Molina is a 69 y.o. female who presents for URI (Cough and sinus issues since dec when tested positive for Covid. ).    HPI     Patient reports that she had COVID at the end of December. She had a bad, persistent cough and was placed on antibiotics in January. She felt better after the antibiotics and when she went to [place], however when she returned home from [place] about a month ago she started to feel sick again. She reports wet cough, nasal congestion, sinus congestion, post-nasal drip, right ear/facial pressure. Has had some nausea from the post-nasal drip. She has heard some wheezes at times. The cough gets worse with talking, moving, and laying down. She denies fevers, chills, body aches, headaches, sore throat, vomiting, or diarrhea. She has been taking Claritin, Robitussin, and albuterol with little relief in symptoms. She has been sleeping in the recliner due since laying flat makes the cough worse.     Review of Systems  ROS negative except as noted above in HPI.     Objective   /73   Pulse 82   Temp 36.8 °C (98.2 °F) (Oral)   Ht 1.549 m (5' 1\")   Wt 64 kg (141 lb)   LMP  (LMP Unknown)   SpO2 95%   BMI 26.64 kg/m²     Physical Exam  General: Alert and oriented, in no acute distress. Appears stated age, well-nourished, and well hydrated  HEENT:  - Head: Normocephalic and atraumatic   - Eyes: EOMI, PERRLA  - ENT: Hearing grossly intact. Mucus membranes pink and moist without lesions. Tonsils present without swelling or exudates. Good dentition. TMs gray. TTP of frontal sinuses.   Neck: Supple. No stiffness. No thyromegaly or thyroid nodules  Heart: RRR. No murmurs, clicks, or rubs  Lungs: Unlabored breathing. Frequent, dry cough. Some faint expiratory wheezes with coughing  Abdomen: Normal BS in all 4 quadrants. Soft, non-tender, non-distended, with no masses  Musculoskeletal: Normal gait and station  Neurological: Alert and oriented. No gross neurological " deficits  Psychological: Appropriate mood and affect  Skin: No rash, abnormal lesions, cyanosis, or erythema     Assessment/Plan   Diagnoses and all orders for this visit:  Lower respiratory infection  -     predniSONE (Deltasone) 20 mg tablet; Take 2 tablets (40 mg) by mouth once daily for 5 days.  -     benzonatate (Tessalon) 100 mg capsule; Take 1 capsule (100 mg) by mouth 3 times a day as needed for cough. Do not crush or chew.  -     azithromycin (Zithromax) 250 mg tablet; Take 2 tabs (500 mg) by mouth today, than 1 daily for 4 days.  -     Recommend Afrin nasal spray BID for 3 days  -     Recommend mucinex  -     Rest, fluids    FU if symptoms persist or worsen    PRAVEEN Shaffer-CNP  [facility] [employer]

## 2025-03-08 ASSESSMENT — ENCOUNTER SYMPTOMS
SYNCOPE: 0
NEAR-SYNCOPE: 0
ORTHOPNEA: 0
PND: 0
PALPITATIONS: 0
DYSPNEA ON EXERTION: 0

## 2025-05-14 DIAGNOSIS — R00.2 HEART PALPITATIONS: ICD-10-CM

## 2025-05-14 RX ORDER — METOPROLOL SUCCINATE 25 MG/1
25 TABLET, EXTENDED RELEASE ORAL DAILY
Qty: 90 TABLET | Refills: 2 | Status: SHIPPED | OUTPATIENT
Start: 2025-05-14

## 2025-05-27 DIAGNOSIS — Z12.11 COLON CANCER SCREENING: ICD-10-CM

## 2025-06-07 LAB — NONINV COLON CA DNA+OCC BLD SCRN STL QL: NEGATIVE

## 2025-08-09 DIAGNOSIS — E78.5 HYPERLIPIDEMIA, UNSPECIFIED HYPERLIPIDEMIA TYPE: ICD-10-CM

## 2025-08-11 RX ORDER — PRAVASTATIN SODIUM 20 MG/1
20 TABLET ORAL DAILY
Qty: 90 TABLET | Refills: 0 | Status: SHIPPED | OUTPATIENT
Start: 2025-08-11

## 2025-08-18 ENCOUNTER — CLINICAL SUPPORT (OUTPATIENT)
Dept: PRIMARY CARE | Facility: CLINIC | Age: 70
End: 2025-08-18
Payer: MEDICARE

## 2025-08-18 ENCOUNTER — TELEPHONE (OUTPATIENT)
Dept: PRIMARY CARE | Facility: CLINIC | Age: 70
End: 2025-08-18
Payer: MEDICARE

## 2025-08-18 DIAGNOSIS — J30.9 ALLERGIC RHINITIS, UNSPECIFIED SEASONALITY, UNSPECIFIED TRIGGER: ICD-10-CM

## 2025-08-18 PROCEDURE — 96372 THER/PROPH/DIAG INJ SC/IM: CPT | Performed by: FAMILY MEDICINE

## 2025-08-18 RX ORDER — TRIAMCINOLONE ACETONIDE 40 MG/ML
80 INJECTION, SUSPENSION INTRA-ARTICULAR; INTRAMUSCULAR ONCE
Status: COMPLETED | OUTPATIENT
Start: 2025-08-18 | End: 2025-08-18

## 2025-08-18 RX ADMIN — TRIAMCINOLONE ACETONIDE 80 MG: 40 INJECTION, SUSPENSION INTRA-ARTICULAR; INTRAMUSCULAR at 10:37

## 2025-12-16 ENCOUNTER — APPOINTMENT (OUTPATIENT)
Dept: PRIMARY CARE | Facility: CLINIC | Age: 70
End: 2025-12-16
Payer: MEDICARE